# Patient Record
Sex: FEMALE | Employment: UNEMPLOYED | URBAN - METROPOLITAN AREA
[De-identification: names, ages, dates, MRNs, and addresses within clinical notes are randomized per-mention and may not be internally consistent; named-entity substitution may affect disease eponyms.]

---

## 2020-12-11 ENCOUNTER — APPOINTMENT (OUTPATIENT)
Dept: GENERAL RADIOLOGY | Age: 70
DRG: 871 | End: 2020-12-11
Payer: MEDICARE

## 2020-12-11 ENCOUNTER — HOSPITAL ENCOUNTER (INPATIENT)
Age: 70
LOS: 8 days | Discharge: HOME | DRG: 871 | End: 2020-12-20
Attending: EMERGENCY MEDICINE | Admitting: INTERNAL MEDICINE
Payer: MEDICARE

## 2020-12-11 LAB
ALBUMIN SERPL-MCNC: 2.7 G/DL (ref 3.5–5.2)
ALP BLD-CCNC: 70 U/L (ref 35–104)
ALT SERPL-CCNC: 24 U/L (ref 0–32)
ANION GAP SERPL CALCULATED.3IONS-SCNC: 10 MMOL/L (ref 7–16)
AST SERPL-CCNC: 32 U/L (ref 0–31)
BASOPHILS ABSOLUTE: 0.01 E9/L (ref 0–0.2)
BASOPHILS RELATIVE PERCENT: 0.1 % (ref 0–2)
BILIRUB SERPL-MCNC: 0.5 MG/DL (ref 0–1.2)
BUN BLDV-MCNC: 26 MG/DL (ref 8–23)
CALCIUM SERPL-MCNC: 8.1 MG/DL (ref 8.6–10.2)
CHLORIDE BLD-SCNC: 99 MMOL/L (ref 98–107)
CO2: 23 MMOL/L (ref 22–29)
CREAT SERPL-MCNC: 1.4 MG/DL (ref 0.5–1)
EOSINOPHILS ABSOLUTE: 0.04 E9/L (ref 0.05–0.5)
EOSINOPHILS RELATIVE PERCENT: 0.4 % (ref 0–6)
GFR AFRICAN AMERICAN: 45
GFR NON-AFRICAN AMERICAN: 37 ML/MIN/1.73
GLUCOSE BLD-MCNC: 142 MG/DL (ref 74–99)
HCT VFR BLD CALC: 38.2 % (ref 34–48)
HEMOGLOBIN: 12.6 G/DL (ref 11.5–15.5)
IMMATURE GRANULOCYTES #: 0.18 E9/L
IMMATURE GRANULOCYTES %: 2 % (ref 0–5)
LACTIC ACID, SEPSIS: 1.2 MMOL/L (ref 0.5–1.9)
LYMPHOCYTES ABSOLUTE: 0.72 E9/L (ref 1.5–4)
LYMPHOCYTES RELATIVE PERCENT: 8.1 % (ref 20–42)
MCH RBC QN AUTO: 29.9 PG (ref 26–35)
MCHC RBC AUTO-ENTMCNC: 33 % (ref 32–34.5)
MCV RBC AUTO: 90.5 FL (ref 80–99.9)
MONOCYTES ABSOLUTE: 0.38 E9/L (ref 0.1–0.95)
MONOCYTES RELATIVE PERCENT: 4.3 % (ref 2–12)
NEUTROPHILS ABSOLUTE: 7.61 E9/L (ref 1.8–7.3)
NEUTROPHILS RELATIVE PERCENT: 85.1 % (ref 43–80)
PDW BLD-RTO: 13.4 FL (ref 11.5–15)
PLATELET # BLD: 334 E9/L (ref 130–450)
PMV BLD AUTO: 8.9 FL (ref 7–12)
POTASSIUM REFLEX MAGNESIUM: 4.3 MMOL/L (ref 3.5–5)
PRO-BNP: 359 PG/ML (ref 0–125)
RBC # BLD: 4.22 E12/L (ref 3.5–5.5)
SARS-COV-2, NAAT: DETECTED
SODIUM BLD-SCNC: 132 MMOL/L (ref 132–146)
TOTAL PROTEIN: 6.2 G/DL (ref 6.4–8.3)
TROPONIN: <0.01 NG/ML (ref 0–0.03)
WBC # BLD: 8.9 E9/L (ref 4.5–11.5)

## 2020-12-11 PROCEDURE — 85025 COMPLETE CBC W/AUTO DIFF WBC: CPT

## 2020-12-11 PROCEDURE — 2580000003 HC RX 258: Performed by: STUDENT IN AN ORGANIZED HEALTH CARE EDUCATION/TRAINING PROGRAM

## 2020-12-11 PROCEDURE — 83880 ASSAY OF NATRIURETIC PEPTIDE: CPT

## 2020-12-11 PROCEDURE — U0002 COVID-19 LAB TEST NON-CDC: HCPCS

## 2020-12-11 PROCEDURE — 80053 COMPREHEN METABOLIC PANEL: CPT

## 2020-12-11 PROCEDURE — 71045 X-RAY EXAM CHEST 1 VIEW: CPT

## 2020-12-11 PROCEDURE — 84484 ASSAY OF TROPONIN QUANT: CPT

## 2020-12-11 PROCEDURE — 83605 ASSAY OF LACTIC ACID: CPT

## 2020-12-11 PROCEDURE — 99284 EMERGENCY DEPT VISIT MOD MDM: CPT

## 2020-12-11 PROCEDURE — 81001 URINALYSIS AUTO W/SCOPE: CPT

## 2020-12-11 PROCEDURE — 96374 THER/PROPH/DIAG INJ IV PUSH: CPT

## 2020-12-11 PROCEDURE — 87040 BLOOD CULTURE FOR BACTERIA: CPT

## 2020-12-11 PROCEDURE — 6360000002 HC RX W HCPCS: Performed by: STUDENT IN AN ORGANIZED HEALTH CARE EDUCATION/TRAINING PROGRAM

## 2020-12-11 PROCEDURE — 93005 ELECTROCARDIOGRAM TRACING: CPT | Performed by: STUDENT IN AN ORGANIZED HEALTH CARE EDUCATION/TRAINING PROGRAM

## 2020-12-11 RX ORDER — 0.9 % SODIUM CHLORIDE 0.9 %
1000 INTRAVENOUS SOLUTION INTRAVENOUS ONCE
Status: COMPLETED | OUTPATIENT
Start: 2020-12-11 | End: 2020-12-12

## 2020-12-11 RX ORDER — DEXAMETHASONE SODIUM PHOSPHATE 10 MG/ML
6 INJECTION INTRAMUSCULAR; INTRAVENOUS ONCE
Status: COMPLETED | OUTPATIENT
Start: 2020-12-11 | End: 2020-12-11

## 2020-12-11 RX ADMIN — DEXAMETHASONE SODIUM PHOSPHATE 6 MG: 10 INJECTION INTRAMUSCULAR; INTRAVENOUS at 23:37

## 2020-12-11 RX ADMIN — SODIUM CHLORIDE 1000 ML: 9 INJECTION, SOLUTION INTRAVENOUS at 23:37

## 2020-12-12 PROBLEM — J12.82 PNEUMONIA DUE TO COVID-19 VIRUS: Status: ACTIVE | Noted: 2020-12-12

## 2020-12-12 PROBLEM — U07.1 ACUTE RESPIRATORY FAILURE DUE TO COVID-19 (HCC): Status: ACTIVE | Noted: 2020-12-12

## 2020-12-12 PROBLEM — J96.00 ACUTE RESPIRATORY FAILURE DUE TO COVID-19 (HCC): Status: ACTIVE | Noted: 2020-12-12

## 2020-12-12 PROBLEM — N17.9 AKI (ACUTE KIDNEY INJURY) (HCC): Status: ACTIVE | Noted: 2020-12-12

## 2020-12-12 PROBLEM — U07.1 PNEUMONIA DUE TO COVID-19 VIRUS: Status: ACTIVE | Noted: 2020-12-12

## 2020-12-12 LAB
ALBUMIN SERPL-MCNC: 2.9 G/DL (ref 3.5–5.2)
ALP BLD-CCNC: 70 U/L (ref 35–104)
ALT SERPL-CCNC: 25 U/L (ref 0–32)
ANION GAP SERPL CALCULATED.3IONS-SCNC: 8 MMOL/L (ref 7–16)
AST SERPL-CCNC: 26 U/L (ref 0–31)
BACTERIA: ABNORMAL /HPF
BASOPHILS ABSOLUTE: 0.01 E9/L (ref 0–0.2)
BASOPHILS RELATIVE PERCENT: 0.1 % (ref 0–2)
BILIRUB SERPL-MCNC: 0.4 MG/DL (ref 0–1.2)
BILIRUBIN URINE: NEGATIVE
BLOOD, URINE: ABNORMAL
BUN BLDV-MCNC: 25 MG/DL (ref 8–23)
C-REACTIVE PROTEIN: 28.1 MG/DL (ref 0–0.4)
CALCIUM SERPL-MCNC: 7.9 MG/DL (ref 8.6–10.2)
CHLORIDE BLD-SCNC: 103 MMOL/L (ref 98–107)
CLARITY: CLEAR
CO2: 21 MMOL/L (ref 22–29)
COARSE CASTS, UA: ABNORMAL /LPF (ref 0–2)
COLOR: ABNORMAL
CREAT SERPL-MCNC: 1.1 MG/DL (ref 0.5–1)
EKG ATRIAL RATE: 110 BPM
EKG P AXIS: 68 DEGREES
EKG P-R INTERVAL: 180 MS
EKG Q-T INTERVAL: 310 MS
EKG QRS DURATION: 68 MS
EKG QTC CALCULATION (BAZETT): 419 MS
EKG R AXIS: -49 DEGREES
EKG T AXIS: 64 DEGREES
EKG VENTRICULAR RATE: 110 BPM
EOSINOPHILS ABSOLUTE: 0 E9/L (ref 0.05–0.5)
EOSINOPHILS RELATIVE PERCENT: 0 % (ref 0–6)
EPITHELIAL CELLS, UA: ABNORMAL /HPF
FERRITIN: 552 NG/ML
GFR AFRICAN AMERICAN: 59
GFR NON-AFRICAN AMERICAN: 49 ML/MIN/1.73
GLUCOSE BLD-MCNC: 167 MG/DL (ref 74–99)
GLUCOSE URINE: NEGATIVE MG/DL
HCT VFR BLD CALC: 35.6 % (ref 34–48)
HEMOGLOBIN: 11.6 G/DL (ref 11.5–15.5)
HYALINE CASTS: ABNORMAL /LPF (ref 0–2)
IMMATURE GRANULOCYTES #: 0.17 E9/L
IMMATURE GRANULOCYTES %: 2.1 % (ref 0–5)
KETONES, URINE: NEGATIVE MG/DL
LACTATE DEHYDROGENASE: 411 U/L (ref 135–214)
LACTIC ACID, SEPSIS: 1.1 MMOL/L (ref 0.5–1.9)
LEUKOCYTE ESTERASE, URINE: NEGATIVE
LYMPHOCYTES ABSOLUTE: 0.36 E9/L (ref 1.5–4)
LYMPHOCYTES RELATIVE PERCENT: 4.4 % (ref 20–42)
MCH RBC QN AUTO: 29.4 PG (ref 26–35)
MCHC RBC AUTO-ENTMCNC: 32.6 % (ref 32–34.5)
MCV RBC AUTO: 90.4 FL (ref 80–99.9)
MONOCYTES ABSOLUTE: 0.24 E9/L (ref 0.1–0.95)
MONOCYTES RELATIVE PERCENT: 2.9 % (ref 2–12)
NEUTROPHILS ABSOLUTE: 7.42 E9/L (ref 1.8–7.3)
NEUTROPHILS RELATIVE PERCENT: 90.5 % (ref 43–80)
NITRITE, URINE: NEGATIVE
PDW BLD-RTO: 13.5 FL (ref 11.5–15)
PH UA: 5.5 (ref 5–9)
PLATELET # BLD: 304 E9/L (ref 130–450)
PMV BLD AUTO: 8.8 FL (ref 7–12)
POTASSIUM REFLEX MAGNESIUM: 4.9 MMOL/L (ref 3.5–5)
PROTEIN UA: 100 MG/DL
RBC # BLD: 3.94 E12/L (ref 3.5–5.5)
RBC # BLD: NORMAL 10*6/UL
RBC UA: ABNORMAL /HPF (ref 0–2)
SODIUM BLD-SCNC: 132 MMOL/L (ref 132–146)
SPECIFIC GRAVITY UA: 1.02 (ref 1–1.03)
TOTAL PROTEIN: 6.2 G/DL (ref 6.4–8.3)
UROBILINOGEN, URINE: 1 E.U./DL
WBC # BLD: 8.2 E9/L (ref 4.5–11.5)
WBC UA: ABNORMAL /HPF (ref 0–5)

## 2020-12-12 PROCEDURE — 83615 LACTATE (LD) (LDH) ENZYME: CPT

## 2020-12-12 PROCEDURE — 6360000002 HC RX W HCPCS: Performed by: NURSE PRACTITIONER

## 2020-12-12 PROCEDURE — 82728 ASSAY OF FERRITIN: CPT

## 2020-12-12 PROCEDURE — 80053 COMPREHEN METABOLIC PANEL: CPT

## 2020-12-12 PROCEDURE — 2580000003 HC RX 258: Performed by: NURSE PRACTITIONER

## 2020-12-12 PROCEDURE — 93010 ELECTROCARDIOGRAM REPORT: CPT | Performed by: INTERNAL MEDICINE

## 2020-12-12 PROCEDURE — XW033E5 INTRODUCTION OF REMDESIVIR ANTI-INFECTIVE INTO PERIPHERAL VEIN, PERCUTANEOUS APPROACH, NEW TECHNOLOGY GROUP 5: ICD-10-PCS | Performed by: INTERNAL MEDICINE

## 2020-12-12 PROCEDURE — 85025 COMPLETE CBC W/AUTO DIFF WBC: CPT

## 2020-12-12 PROCEDURE — 2500000003 HC RX 250 WO HCPCS: Performed by: NURSE PRACTITIONER

## 2020-12-12 PROCEDURE — 86140 C-REACTIVE PROTEIN: CPT

## 2020-12-12 PROCEDURE — 2060000000 HC ICU INTERMEDIATE R&B

## 2020-12-12 PROCEDURE — 83605 ASSAY OF LACTIC ACID: CPT

## 2020-12-12 RX ORDER — ACETAMINOPHEN 650 MG/1
650 SUPPOSITORY RECTAL EVERY 6 HOURS PRN
Status: DISCONTINUED | OUTPATIENT
Start: 2020-12-12 | End: 2020-12-20 | Stop reason: HOSPADM

## 2020-12-12 RX ORDER — POTASSIUM CHLORIDE 7.45 MG/ML
10 INJECTION INTRAVENOUS PRN
Status: DISCONTINUED | OUTPATIENT
Start: 2020-12-12 | End: 2020-12-20 | Stop reason: HOSPADM

## 2020-12-12 RX ORDER — PROMETHAZINE HYDROCHLORIDE 25 MG/1
12.5 TABLET ORAL EVERY 6 HOURS PRN
Status: DISCONTINUED | OUTPATIENT
Start: 2020-12-12 | End: 2020-12-20 | Stop reason: HOSPADM

## 2020-12-12 RX ORDER — SODIUM CHLORIDE 9 MG/ML
INJECTION, SOLUTION INTRAVENOUS CONTINUOUS
Status: DISCONTINUED | OUTPATIENT
Start: 2020-12-12 | End: 2020-12-19

## 2020-12-12 RX ORDER — SENNA PLUS 8.6 MG/1
1 TABLET ORAL DAILY PRN
Status: DISCONTINUED | OUTPATIENT
Start: 2020-12-12 | End: 2020-12-20 | Stop reason: HOSPADM

## 2020-12-12 RX ORDER — SODIUM CHLORIDE 0.9 % (FLUSH) 0.9 %
10 SYRINGE (ML) INJECTION EVERY 12 HOURS SCHEDULED
Status: DISCONTINUED | OUTPATIENT
Start: 2020-12-12 | End: 2020-12-20 | Stop reason: HOSPADM

## 2020-12-12 RX ORDER — HYDROCHLOROTHIAZIDE 12.5 MG/1
12.5 TABLET ORAL DAILY
COMMUNITY
End: 2020-12-12

## 2020-12-12 RX ORDER — SODIUM CHLORIDE 0.9 % (FLUSH) 0.9 %
10 SYRINGE (ML) INJECTION PRN
Status: DISCONTINUED | OUTPATIENT
Start: 2020-12-12 | End: 2020-12-20 | Stop reason: HOSPADM

## 2020-12-12 RX ORDER — ACETAMINOPHEN 325 MG/1
650 TABLET ORAL EVERY 6 HOURS PRN
Status: DISCONTINUED | OUTPATIENT
Start: 2020-12-12 | End: 2020-12-20 | Stop reason: HOSPADM

## 2020-12-12 RX ORDER — 0.9 % SODIUM CHLORIDE 0.9 %
30 INTRAVENOUS SOLUTION INTRAVENOUS PRN
Status: DISCONTINUED | OUTPATIENT
Start: 2020-12-12 | End: 2020-12-20 | Stop reason: HOSPADM

## 2020-12-12 RX ORDER — POTASSIUM CHLORIDE 20 MEQ/1
40 TABLET, EXTENDED RELEASE ORAL PRN
Status: DISCONTINUED | OUTPATIENT
Start: 2020-12-12 | End: 2020-12-20 | Stop reason: HOSPADM

## 2020-12-12 RX ORDER — HYDROCHLOROTHIAZIDE 12.5 MG/1
12.5 CAPSULE, GELATIN COATED ORAL DAILY
COMMUNITY

## 2020-12-12 RX ORDER — DEXAMETHASONE SODIUM PHOSPHATE 10 MG/ML
6 INJECTION INTRAMUSCULAR; INTRAVENOUS DAILY
Status: DISCONTINUED | OUTPATIENT
Start: 2020-12-12 | End: 2020-12-20 | Stop reason: HOSPADM

## 2020-12-12 RX ORDER — ONDANSETRON 2 MG/ML
4 INJECTION INTRAMUSCULAR; INTRAVENOUS EVERY 6 HOURS PRN
Status: DISCONTINUED | OUTPATIENT
Start: 2020-12-12 | End: 2020-12-20 | Stop reason: HOSPADM

## 2020-12-12 RX ADMIN — SODIUM CHLORIDE: 9 INJECTION, SOLUTION INTRAVENOUS at 08:59

## 2020-12-12 RX ADMIN — Medication 10 ML: at 21:39

## 2020-12-12 RX ADMIN — REMDESIVIR 200 MG: 100 INJECTION, POWDER, LYOPHILIZED, FOR SOLUTION INTRAVENOUS at 09:24

## 2020-12-12 RX ADMIN — DEXAMETHASONE SODIUM PHOSPHATE 6 MG: 10 INJECTION INTRAMUSCULAR; INTRAVENOUS at 08:59

## 2020-12-12 RX ADMIN — ENOXAPARIN SODIUM 40 MG: 40 INJECTION SUBCUTANEOUS at 08:59

## 2020-12-12 ASSESSMENT — ENCOUNTER SYMPTOMS
SORE THROAT: 0
VOMITING: 0
WHEEZING: 0
ABDOMINAL DISTENTION: 0
SINUS PRESSURE: 0
BACK PAIN: 0
NAUSEA: 0
EYE PAIN: 0
SHORTNESS OF BREATH: 1
EYE DISCHARGE: 0
EYE REDNESS: 0
COUGH: 1
DIARRHEA: 0

## 2020-12-12 NOTE — ED PROVIDER NOTES
[facility]  Department of Emergency Medicine     Written by: Misael Anderson DO  Patient Name: Danielle Cuellar  Attending Provider: Mechelle Sacnhez DO  Admit Date: 2020  9:27 PM  MRN: [id number]                   : 1950        No chief complaint on file. - Chief complaint    Patient past medical history of hypertension, patient presents to the ED shortness of breath. Patient has for the past 2 weeks she has had increasing shortness of breath. She has multiple family members around to have tested positive for COVID-19. She states that initially last week her symptoms began with some diarrhea but have since progressed to shortness of breath. She also notes that she has had a mild fever of 101 degrees. She has been taking aspirin with minimal improvement. She states that her shortness of breath worsened today as well as when she developed a nonproductive cough. Patient was placed on oxygen by EMS due to satting 84% on room air. Patient denies any nausea, vomiting, chest pain, abdominal pain or constipation. The history is provided by the patient. No  was used. Review of Systems   Constitutional: Negative for chills and fever. HENT: Negative for ear pain, sinus pressure and sore throat. Eyes: Negative for pain, discharge and redness. Respiratory: Positive for cough and shortness of breath. Negative for wheezing. Cardiovascular: Negative for chest pain. Gastrointestinal: Negative for abdominal distention, diarrhea, nausea and vomiting. Genitourinary: Negative for dysuria and frequency. Musculoskeletal: Negative for arthralgias and back pain. Skin: Negative for rash and wound. Neurological: Negative for weakness and headaches. Hematological: Negative for adenopathy. All other systems reviewed and are negative. Physical Exam  Vitals signs and nursing note reviewed.    Constitutional:       General: She is not in acute distress. Appearance: Normal appearance. HENT:      Head: Normocephalic and atraumatic. Nose: No congestion or rhinorrhea. Mouth/Throat:      Mouth: Mucous membranes are moist.      Pharynx: Oropharynx is clear. Eyes:      Extraocular Movements: Extraocular movements intact. Pupils: Pupils are equal, round, and reactive to light. Neck:      Musculoskeletal: Normal range of motion. No neck rigidity or muscular tenderness. Cardiovascular:      Rate and Rhythm: Normal rate and regular rhythm. Heart sounds: No murmur. No gallop. Pulmonary:      Effort: Pulmonary effort is normal. No respiratory distress. Breath sounds: Rhonchi present. No wheezing or rales. Abdominal:      General: Abdomen is flat. Palpations: Abdomen is soft. There is no mass. Tenderness: There is no abdominal tenderness. There is no guarding. Hernia: No hernia is present. Musculoskeletal: Normal range of motion. General: No swelling, tenderness or signs of injury. Skin:     General: Skin is warm and dry. Capillary Refill: Capillary refill takes less than 2 seconds. Neurological:      General: No focal deficit present. Mental Status: She is alert and oriented to person, place, and time. Mental status is at baseline. Psychiatric:         Mood and Affect: Mood normal.          Procedures   EKG #1:  Interpreted by emergency department physician unless otherwise noted. Time:  2158    Rate: 110  Rhythm: Sinus. Interpretation: EKG reviewed demonstrated sinus tachycardia with PVCs, rate 110, axis,  no acute ST segment changes. Comparison: stable as compared to patient's most recent EKG. MDM  Number of Diagnoses or Management Options  COVID-19:   Elevated serum creatinine:   Diagnosis management comments: Patient 22-year-old female past medical history hypertension.   Patient presents with 2-week history of fatigue, 1 week history of diarrhea 1 week history of shortness of breath. Patient has multiple exposures to COVID-19. Patient hypoxic on arrival of EMS patient placed on 4 l nasal cannula with improvement to 98%. On presentation SPO2 90%, patient was mildly tachycardic with a rate of 115. Lungs demonstrated diffuse rhonchi bilaterally. EKG showed sinus tachycardia, no acute ST segment changes chest x-ray demonstrated mid and lower lobe consolidations consistent with pneumonia. CMP remarkable for mildly elevated creatinine of 1.4, CBC demonstrated no signs of leukocytosis. Rapid Covid swab was obtained which was positive. Patient was given 6 mg IV Decadron. Due to patient's hypoxia in the setting of COVID-19 infection, decision was made to admit patient. Case discussed with hospitalist who agreed to meet patient. Plan of care discussed with patient include admission, all questions were answered patient agreed with the plan of care and was admitted to the hospital stable condition. Amount and/or Complexity of Data Reviewed  Clinical lab tests: ordered and reviewed  Tests in the radiology section of CPT®: ordered and reviewed    Risk of Complications, Morbidity, and/or Mortality  Presenting problems: moderate  Diagnostic procedures: moderate  Management options: moderate    Patient Progress  Patient progress: stable           --------------------------------------------- PAST HISTORY ---------------------------------------------  Past Medical History:  has a past medical history of Breast disorder and Hypertension. Past Surgical History:  has a past surgical history that includes Breast biopsy (1990). Social History:  reports that she quit smoking about 3 years ago. Her smoking use included cigarettes. She has never used smokeless tobacco. She reports current alcohol use. She reports that she does not use drugs.     Family History: family history includes Diabetes in her mother; Heart Disease in her father; Heart Failure in her mother; Hypertension in her father and mother; Other in her mother; Ovarian Cancer in an other family member; Thyroid Disease in her sister. The patients home medications have been reviewed.     Allergies: Aspirin and Nickel    -------------------------------------------------- RESULTS -------------------------------------------------    LABS:  Results for orders placed or performed during the hospital encounter of 12/11/20   CBC Auto Differential   Result Value Ref Range    WBC 8.9 4.5 - 11.5 E9/L    RBC 4.22 3.50 - 5.50 E12/L    Hemoglobin 12.6 11.5 - 15.5 g/dL    Hematocrit 38.2 34.0 - 48.0 %    MCV 90.5 80.0 - 99.9 fL    MCH 29.9 26.0 - 35.0 pg    MCHC 33.0 32.0 - 34.5 %    RDW 13.4 11.5 - 15.0 fL    Platelets 038 694 - 221 E9/L    MPV 8.9 7.0 - 12.0 fL    Neutrophils % 85.1 (H) 43.0 - 80.0 %    Immature Granulocytes % 2.0 0.0 - 5.0 %    Lymphocytes % 8.1 (L) 20.0 - 42.0 %    Monocytes % 4.3 2.0 - 12.0 %    Eosinophils % 0.4 0.0 - 6.0 %    Basophils % 0.1 0.0 - 2.0 %    Neutrophils Absolute 7.61 (H) 1.80 - 7.30 E9/L    Immature Granulocytes # 0.18 E9/L    Lymphocytes Absolute 0.72 (L) 1.50 - 4.00 E9/L    Monocytes Absolute 0.38 0.10 - 0.95 E9/L    Eosinophils Absolute 0.04 (L) 0.05 - 0.50 E9/L    Basophils Absolute 0.01 0.00 - 0.20 E9/L   Comprehensive Metabolic Panel w/ Reflex to MG   Result Value Ref Range    Sodium 132 132 - 146 mmol/L    Potassium reflex Magnesium 4.3 3.5 - 5.0 mmol/L    Chloride 99 98 - 107 mmol/L    CO2 23 22 - 29 mmol/L    Anion Gap 10 7 - 16 mmol/L    Glucose 142 (H) 74 - 99 mg/dL    BUN 26 (H) 8 - 23 mg/dL    CREATININE 1.4 (H) 0.5 - 1.0 mg/dL    GFR Non-African American 37 >=60 mL/min/1.73    GFR African American 45     Calcium 8.1 (L) 8.6 - 10.2 mg/dL    Total Protein 6.2 (L) 6.4 - 8.3 g/dL    Alb 2.7 (L) 3.5 - 5.2 g/dL    Total Bilirubin 0.5 0.0 - 1.2 mg/dL    Alkaline Phosphatase 70 35 - 104 U/L    ALT 24 0 - 32 U/L    AST 32 (H) 0 - 31 U/L   Troponin   Result Value Ref Range    Troponin <0.01 0.00 - 0.03 ng/mL   Brain Natriuretic Peptide   Result Value Ref Range    Pro- (H) 0 - 125 pg/mL   Lactate, Sepsis   Result Value Ref Range    Lactic Acid, Sepsis 1.2 0.5 - 1.9 mmol/L   COVID-19   Result Value Ref Range    SARS-CoV-2, NAAT DETECTED (A) Not Detected   EKG 12 Lead   Result Value Ref Range    Ventricular Rate 110 BPM    Atrial Rate 110 BPM    P-R Interval 180 ms    QRS Duration 68 ms    Q-T Interval 310 ms    QTc Calculation (Bazett) 419 ms    P Axis 68 degrees    R Axis -49 degrees    T Axis 64 degrees       RADIOLOGY:  XR CHEST PORTABLE   Final Result   Mid and lower lung consolidations consistent with pneumonia.            ------------------------- NURSING NOTES AND VITALS REVIEWED ---------------------------  Date / Time Roomed:  12/11/2020  9:27 PM  ED Bed Assignment:  11/11    The nursing notes within the ED encounter and vital signs as below have been reviewed. Patient Vitals for the past 24 hrs:   BP Temp Pulse Resp SpO2   12/11/20 2149 131/81 97.7 °F (36.5 °C) 115 14 98 %       Oxygen Saturation Interpretation: Abnormal and Improved after treatment    ------------------------------------------ PROGRESS NOTES ------------------------------------------  Re-evaluation(s):  Time: 2300  Patients symptoms are improving  Repeat physical examination is improved    Counseling:  I have spoken with the patient and discussed todays results, in addition to providing specific details for the plan of care and counseling regarding the diagnosis and prognosis. Their questions are answered at this time and they are agreeable with the plan of admission.    --------------------------------- ADDITIONAL PROVIDER NOTES ---------------------------------  Consultations:  Time: 2335. Spoke with [employer]. Discussed case. They will admit the patient.   This patient's ED course included: a personal history and physicial examination, re-evaluation prior to disposition, cardiac monitoring and continuous pulse oximetry    This patient has remained hemodynamically stable during their ED course. Diagnosis:  1. COVID-19    2. Elevated serum creatinine        Disposition:  Patient's disposition: Admit to telemetry  Patient's condition is stable. Patient was seen and evaluated by myself and my attending Manjula De León DO. Assessment and Plan discussed with attending provider, please see attestation for final plan of care.      DO Princess Alves DO  Resident  12/12/20 0000

## 2020-12-12 NOTE — H&P
Raúl Ames M.D. History and Physical      CHIEF COMPLAINT:  Cough sob and fevers at home     Reason for Admission:  Hypoxia covid 19     History Obtained From: pt and emr     HISTORY OF PRESENT ILLNESS:      The patient is a 65 y.o. female of Jone Kahn DO with significant past medical history of no medical conditions  who presents with co weakness fevers and cough / sob that started about a week ago. It has gotten progressively worse. No mylagias  or chills are reported. She is evaluated in ed and noted to be hypoxemic. 92 % on 6l. She is therefore admitted for further evaluation. BP (!) 144/77   Pulse 72   Temp 97.7 °F (36.5 °C)   Resp 20   Ht 5' 6.14\" (1.68 m)   SpO2 92%   BMI 33.24 kg/m²     All labs personally reviewed  - crp 28,   All imaging personally reviewed - mid and ll consolidations     Past Medical History:        Diagnosis Date    Breast disorder     FIBROCYSTIC    Hypertension      Past Surgical History:        Procedure Laterality Date    BREAST BIOPSY  1990    RIGHT BREAST BIOPSY FATTY TUMOR         Medications Prior to Admission:    Not in a hospital admission. Allergies:  Aspirin and Nickel    Social History:   TOBACCO:   reports that she quit smoking about 3 years ago. Her smoking use included cigarettes. She has never used smokeless tobacco.  ETOH:   reports current alcohol use.   MARITAL STATUS:    OCCUPATION:      Family History:       Problem Relation Age of Onset    Heart Disease Father         CORONARY THROMBOSIS    Hypertension Father     Heart Failure Mother     Hypertension Mother     Other Mother         PEPTIC ULCER    Diabetes Mother     Thyroid Disease Sister     Ovarian Cancer Other         AUNT       REVIEW OF SYSTEMS:    General ROS: positive for  - fatigue, fever and malaise  Hematological and Lymphatic ROS: negative  Endocrine ROS: negative  Respiratory ROS: no cough, shortness of breath, or No resolved hospital problems.  *        PLAN:    Admit to tele for covid 19 pna w hypoxemia     remdisivir  and decadron po     Qod anti inflammatory markers     Supportive care with mdi , vitamins        DVT prophylaxis  PT OT  Discharge plan once o2 demands decreased     Fernando Bloom MD  12/12/2020  10:06 AM

## 2020-12-12 NOTE — PROGRESS NOTES
Remdesivir Initiation Note    This patient meets criteria for initiation of remdesivir based on the following:  Proven COVID-19  Moderate disease (Requiring supplemental O2)  Acceptable hepatic function (ALT within 5 times ULN)    Exclusion Criteria:  Severe disease requiring invasive or non-invasive mechanical ventilation (includes HFNC & BiPAP)  Could consider use in patients requiring high flow if early on in the disease course (based on symptom duration)  Use of more than 1 vasopressor prior to remdesivir initiation  Already improving on supportive treatment and/or impending discharge  Patients in whom the clinical team think death is in the immediate short-term where remdesivir is unlikely to change the clinical outcome     Liver function tests will be monitored daily while on remdesivir.

## 2020-12-13 LAB
INR BLD: 1.1
PROTHROMBIN TIME: 12.4 SEC (ref 9.3–12.4)

## 2020-12-13 PROCEDURE — 2500000003 HC RX 250 WO HCPCS: Performed by: NURSE PRACTITIONER

## 2020-12-13 PROCEDURE — 36415 COLL VENOUS BLD VENIPUNCTURE: CPT

## 2020-12-13 PROCEDURE — 2700000000 HC OXYGEN THERAPY PER DAY

## 2020-12-13 PROCEDURE — 85610 PROTHROMBIN TIME: CPT

## 2020-12-13 PROCEDURE — 2060000000 HC ICU INTERMEDIATE R&B

## 2020-12-13 PROCEDURE — 2580000003 HC RX 258: Performed by: NURSE PRACTITIONER

## 2020-12-13 PROCEDURE — 6360000002 HC RX W HCPCS: Performed by: NURSE PRACTITIONER

## 2020-12-13 RX ADMIN — SODIUM CHLORIDE: 9 INJECTION, SOLUTION INTRAVENOUS at 00:06

## 2020-12-13 RX ADMIN — SODIUM CHLORIDE: 9 INJECTION, SOLUTION INTRAVENOUS at 20:42

## 2020-12-13 RX ADMIN — ENOXAPARIN SODIUM 40 MG: 40 INJECTION SUBCUTANEOUS at 08:26

## 2020-12-13 RX ADMIN — Medication 10 ML: at 20:42

## 2020-12-13 RX ADMIN — DEXAMETHASONE SODIUM PHOSPHATE 6 MG: 10 INJECTION INTRAMUSCULAR; INTRAVENOUS at 08:27

## 2020-12-13 RX ADMIN — REMDESIVIR 100 MG: 100 INJECTION, POWDER, LYOPHILIZED, FOR SOLUTION INTRAVENOUS at 19:38

## 2020-12-13 NOTE — PLAN OF CARE
Problem: Skin Integrity:  Goal: Will show no infection signs and symptoms  Description: Will show no infection signs and symptoms  Outcome: Met This Shift     Problem: Falls - Risk of:  Goal: Will remain free from falls  Description: Will remain free from falls  Outcome: Met This Shift     Problem: Airway Clearance - Ineffective  Goal: Achieve or maintain patent airway  Outcome: Ongoing     Problem: Fatigue  Goal: Verbalize increase energy and improved vitality  Outcome: Ongoing

## 2020-12-13 NOTE — PROGRESS NOTES
Messaged Dr. Austin Patel regarding patients blood pressure. No new orders at this time.     Delores oSlis

## 2020-12-13 NOTE — ED NOTES
Pt attempted to be weaned off of NRB pt sat dropped to 89% NRB placed back on      Shahida Ferrell RN  12/12/20 2121

## 2020-12-14 LAB
ANION GAP SERPL CALCULATED.3IONS-SCNC: 8 MMOL/L (ref 7–16)
ANISOCYTOSIS: ABNORMAL
BASOPHILS ABSOLUTE: 0.02 E9/L (ref 0–0.2)
BASOPHILS RELATIVE PERCENT: 0.1 % (ref 0–2)
BUN BLDV-MCNC: 28 MG/DL (ref 8–23)
BURR CELLS: ABNORMAL
C-REACTIVE PROTEIN: 5.6 MG/DL (ref 0–0.4)
CALCIUM SERPL-MCNC: 8.2 MG/DL (ref 8.6–10.2)
CHLORIDE BLD-SCNC: 106 MMOL/L (ref 98–107)
CO2: 23 MMOL/L (ref 22–29)
CREAT SERPL-MCNC: 1 MG/DL (ref 0.5–1)
D DIMER: 2919 NG/ML DDU
EOSINOPHILS ABSOLUTE: 0 E9/L (ref 0.05–0.5)
EOSINOPHILS RELATIVE PERCENT: 0 % (ref 0–6)
GFR AFRICAN AMERICAN: >60
GFR NON-AFRICAN AMERICAN: 54 ML/MIN/1.73
GLUCOSE BLD-MCNC: 143 MG/DL (ref 74–99)
HCT VFR BLD CALC: 35.7 % (ref 34–48)
HEMOGLOBIN: 11.7 G/DL (ref 11.5–15.5)
IMMATURE GRANULOCYTES #: 0.31 E9/L
IMMATURE GRANULOCYTES %: 1.6 % (ref 0–5)
LYMPHOCYTES ABSOLUTE: 0.58 E9/L (ref 1.5–4)
LYMPHOCYTES RELATIVE PERCENT: 3 % (ref 20–42)
MCH RBC QN AUTO: 29.8 PG (ref 26–35)
MCHC RBC AUTO-ENTMCNC: 32.8 % (ref 32–34.5)
MCV RBC AUTO: 90.8 FL (ref 80–99.9)
MONOCYTES ABSOLUTE: 0.84 E9/L (ref 0.1–0.95)
MONOCYTES RELATIVE PERCENT: 4.3 % (ref 2–12)
NEUTROPHILS ABSOLUTE: 17.6 E9/L (ref 1.8–7.3)
NEUTROPHILS RELATIVE PERCENT: 91 % (ref 43–80)
OVALOCYTES: ABNORMAL
PDW BLD-RTO: 13.6 FL (ref 11.5–15)
PLATELET # BLD: 392 E9/L (ref 130–450)
PMV BLD AUTO: 9 FL (ref 7–12)
POIKILOCYTES: ABNORMAL
POLYCHROMASIA: ABNORMAL
POTASSIUM SERPL-SCNC: 5.1 MMOL/L (ref 3.5–5)
RBC # BLD: 3.93 E12/L (ref 3.5–5.5)
SODIUM BLD-SCNC: 137 MMOL/L (ref 132–146)
TROPONIN: <0.01 NG/ML (ref 0–0.03)
WBC # BLD: 19.4 E9/L (ref 4.5–11.5)

## 2020-12-14 PROCEDURE — 36415 COLL VENOUS BLD VENIPUNCTURE: CPT

## 2020-12-14 PROCEDURE — 85025 COMPLETE CBC W/AUTO DIFF WBC: CPT

## 2020-12-14 PROCEDURE — 84484 ASSAY OF TROPONIN QUANT: CPT

## 2020-12-14 PROCEDURE — 93005 ELECTROCARDIOGRAM TRACING: CPT | Performed by: INTERNAL MEDICINE

## 2020-12-14 PROCEDURE — 86140 C-REACTIVE PROTEIN: CPT

## 2020-12-14 PROCEDURE — 6360000002 HC RX W HCPCS: Performed by: NURSE PRACTITIONER

## 2020-12-14 PROCEDURE — 6370000000 HC RX 637 (ALT 250 FOR IP): Performed by: INTERNAL MEDICINE

## 2020-12-14 PROCEDURE — 2060000000 HC ICU INTERMEDIATE R&B

## 2020-12-14 PROCEDURE — 97165 OT EVAL LOW COMPLEX 30 MIN: CPT

## 2020-12-14 PROCEDURE — 80048 BASIC METABOLIC PNL TOTAL CA: CPT

## 2020-12-14 PROCEDURE — 2580000003 HC RX 258: Performed by: NURSE PRACTITIONER

## 2020-12-14 PROCEDURE — 85378 FIBRIN DEGRADE SEMIQUANT: CPT

## 2020-12-14 PROCEDURE — 97530 THERAPEUTIC ACTIVITIES: CPT

## 2020-12-14 PROCEDURE — 97162 PT EVAL MOD COMPLEX 30 MIN: CPT

## 2020-12-14 PROCEDURE — 2500000003 HC RX 250 WO HCPCS: Performed by: NURSE PRACTITIONER

## 2020-12-14 RX ORDER — HYDROCHLOROTHIAZIDE 12.5 MG/1
12.5 TABLET ORAL DAILY
Status: DISCONTINUED | OUTPATIENT
Start: 2020-12-14 | End: 2020-12-20 | Stop reason: HOSPADM

## 2020-12-14 RX ORDER — LISINOPRIL 10 MG/1
10 TABLET ORAL DAILY
Status: DISCONTINUED | OUTPATIENT
Start: 2020-12-14 | End: 2020-12-20 | Stop reason: HOSPADM

## 2020-12-14 RX ADMIN — SODIUM CHLORIDE: 9 INJECTION, SOLUTION INTRAVENOUS at 17:06

## 2020-12-14 RX ADMIN — HYDROCHLOROTHIAZIDE 12.5 MG: 12.5 TABLET ORAL at 19:17

## 2020-12-14 RX ADMIN — ENOXAPARIN SODIUM 40 MG: 40 INJECTION SUBCUTANEOUS at 09:05

## 2020-12-14 RX ADMIN — Medication 10 ML: at 09:06

## 2020-12-14 RX ADMIN — LISINOPRIL 10 MG: 10 TABLET ORAL at 19:17

## 2020-12-14 RX ADMIN — DEXAMETHASONE SODIUM PHOSPHATE 6 MG: 10 INJECTION INTRAMUSCULAR; INTRAVENOUS at 09:05

## 2020-12-14 RX ADMIN — REMDESIVIR 100 MG: 100 INJECTION, POWDER, LYOPHILIZED, FOR SOLUTION INTRAVENOUS at 17:06

## 2020-12-14 ASSESSMENT — PAIN SCALES - GENERAL: PAINLEVEL_OUTOF10: 0

## 2020-12-14 NOTE — PLAN OF CARE
Problem: Gas Exchange - Impaired  Goal: Absence of hypoxia  Outcome: Met This Shift     Problem: Fatigue  Goal: Verbalize increase energy and improved vitality  12/14/2020 1426 by Negra Parrish RN  Outcome: Met This Shift  12/14/2020 0059 by Rosendo Nieto RN  Outcome: Ongoing     Problem: Skin Integrity:  Goal: Will show no infection signs and symptoms  Description: Will show no infection signs and symptoms  12/14/2020 1426 by Negra Parrish RN  Outcome: Met This Shift  12/14/2020 0059 by Rosendo Nieto RN  Outcome: Met This Shift  Goal: Absence of new skin breakdown  Description: Absence of new skin breakdown  Outcome: Met This Shift     Problem: Falls - Risk of:  Goal: Will remain free from falls  Description: Will remain free from falls  12/14/2020 1426 by Negra Parrish RN  Outcome: Met This Shift  12/14/2020 0059 by Rosendo Nieto RN  Outcome: Met This Shift  Goal: Absence of physical injury  Description: Absence of physical injury  Outcome: Met This Shift

## 2020-12-14 NOTE — PROGRESS NOTES
Subjective: The patient is awake and alert. No acute events overnight. Denies chest pain, angina, SOB   Still on nonrebreather at 15 L  Feels the same. Dyspneic on ambulation    Objective:    BP (!) 156/79   Pulse 86   Temp 96.2 °F (35.7 °C) (Axillary)   Resp 21   Ht 5' 6\" (1.676 m)   Wt 242 lb 6.4 oz (110 kg)   SpO2 92%   BMI 39.12 kg/m²     In: 300 [P.O.:300]  Out: 1200   In: 300   Out: 1200 [Urine:1200]    General appearance: NAD, conversant  HEENT: AT/NC, MMM  Neck: FROM, supple  Lungs: Clear to auscultation  CV: RRR, no MRGs  Vasc: Radial pulses 2+  Abdomen: Soft, non-tender; no masses or HSM  Extremities: No peripheral edema or digital cyanosis  Skin: no rash, lesions or ulcers  Psych: Alert and oriented to person, place and time  Neuro: Alert and interactive     Recent Labs     12/11/20  2216 12/12/20  0615   WBC 8.9 8.2   HGB 12.6 11.6   HCT 38.2 35.6    304       Recent Labs     12/11/20  2216 12/12/20  0615    132   K 4.3 4.9   CL 99 103   CO2 23 21*   BUN 26* 25*   CREATININE 1.4* 1.1*   CALCIUM 8.1* 7.9*       Assessment:    Principal Problem:    Acute respiratory failure due to COVID-19 [facility])  Active Problems:    SUKH (acute kidney injury) ([facility] [address]    Pneumonia due to COVID-19 virus  Resolved Problems:    * No resolved hospital problems.  *      Plan:    Admit to tele for covid 19 pna w hypoxemia      remdisivir day 3 and decadron po-saturating at 92% on 15 l     Qod anti inflammatory markers      Supportive care with mdi , vitamins    pulm eval id d dimer up     Labs today     Check a.m. labs  DVT Prophylaxis   PT/OT  Discharge planning           Royal Anurag MD  12:15 PM  12/14/2020

## 2020-12-14 NOTE — PROGRESS NOTES
Physical Therapy  Physical Therapy Initial Assessment   Name: González Mayen  : 1950  MRN: [id number]    Referring Provider:  SANJUANA Rankin CNP    Date of Service: 2020    Evaluating PT:  Silvia Iraheta PT, DPT [id number]    Room #:  5940/4943-K  Diagnosis:  Acute Respiratory Failure due COVID-19  PMHx/PSHx:  HTN  Precautions:  Falls, Droplet Plus Isolation, COVID +, O2 via HFNC  Equipment Needs:  TBD    SUBJECTIVE:    Pt lives with daughter, DARA, and grand children in a 2 story home with 4 stairs to enter and 1 rail. Bed is on first floor and bath is on first floor. Pt ambulated with no AD independently PTA. OBJECTIVE:   Initial Evaluation  Date: 2020 Treatment Short Term/ Long Term   Goals   AM-PAC 6 Clicks 64/49     Was pt agreeable to Eval/treatment? yes     Does pt have pain? No c/o pain     Bed Mobility  NT, pt in bedside chair at beginning and end of session  Rolling: Independent  Supine to sit: Independent  Sit to supine: Independent  Scooting: Independent   Transfers Sit to stand: SBA  Stand to sit: SBA  Stand pivot: SBA no AD  Sit to stand: Independent  Stand to sit: Independent  Stand pivot: Independent   Ambulation    25 feet with no AD SBA  150 feet with no AD Independent   Stair negotiation: ascended and descended  NT  4 steps with 1 rail Jaz   ROM BUE:  Per OT note  BLE:  WNL     Strength BUE:  Per OT note  BLE:  WNL     Balance Sitting EOB:  Independent  Dynamic Standing:  SBA no AD  Sitting EOB:  Independent  Dynamic Standing:  Independent     Pt is A & O x 4  Sensation:  Pt denies numbness and tingling to extremities  Edema:  unremarkable    Vitals:  SPo2 monitored throughout session on 15L HFNC  Resting 96%  With light activity 88-90%  Ambulating 80%  Seated recovery 90% in 2 minutes  91% on departure seated in chair    Patient education  Pt educated on role of PT intervention.   Pt educated on safety in room with utilization of call light for assistance with mobility. Pt educated on importance of maximizing OOB time by transferring to bedside chair for meals and ambulating to bathroom/transferring to bedside commode with assistance from nursing and therapy staff to increase functional activity tolerance and overall functional independence. Patient response to education:   Pt verbalized understanding Pt demonstrated skill Pt requires further education in this area   yes yes yes     ASSESSMENT:    Comments:  Additional time was required for donning/doffing of all necessary PPE for droplet plus isolation for COVID-19 as well as proper sanitization of all equipment utilized during session. RN cleared pt for activity prior to session. Pt received supine in bed and agreeable to PT intervention at this time. Pt performed all functional mobility as noted above. Pt functioning near baseline in terms of balance and strength but currently has impaired activity tolerance and is requiring supplemental O2 to maintain good SPO2. Pt ambulated to bathroom where she completed toileting and hygiene at SBA level. Pt then ambulated back to bedside chair where she was left with all needs met and call light in reach. Pt requires continued skilled PT intervention for the purposes of improving functional activity tolerance to restore functional independence. Treatment:  Patient practiced and was instructed in the following treatment:     Therapeutic Activities Completed:  o Functional mobility as noted above:   - Transfer training: SBA all aspects for safety, line management and vitals monitoring. Pt able to complete with min VC for proper use of BUE. Stand pivot completed x 2 reps chair<>toilet. STS completed from chair and toilet both at SBA. - Ambulation: 25 feet with no AD x 2 reps. Pt desaturates quickly with activity but does not appear to be in distress.     o Skilled repositioning in seated position for comfort and good posture to promote improved cardiorespiratory function. o Skilled vitals monitoring as noted above.  o Pt education as noted above. Pt's/ family goals   1. Return home. Patient and or family understand(s) diagnosis, prognosis, and plan of care. yes    PLAN OF CARE:    Current Treatment Recommendations     [x] Strengthening     [x] ROM   [x] Balance Training   [x] Endurance Training   [x] Transfer Training   [x] Gait Training   [x] Stair Training   [x] Positioning   [x] Safety and Education Training   [x] Patient/Caregiver Education   [] HEP  [] Other     PT care will be provided in accordance with the objectives noted above. The above treatment recommendations will be utilized to address deficits described above in order to restore pt's prior level of function and/or achieve modified functional independence with adaptive strategies. Frequency of treatments: 2-5x/week x 1-2 weeks. Time in  1445  Time out  1505    Total Treatment Time  15 minutes     Evaluation Time includes thorough review of current medical information, gathering information on past medical history/social history and prior level of function, completion of standardized testing/informal observation of tasks, assessment of data and education on plan of care and goals.     CPT codes:  [] Low Complexity PT evaluation 58081  [x] Moderate Complexity PT evaluation 24491  [] High Complexity PT evaluation 65411  [] PT Re-evaluation 34548  [] Gait training 64153 0 minutes  [] Manual therapy 52403 0 minutes  [x] Therapeutic activities 88519 15 minutes  [] Therapeutic exercises 04594 0 minutes  [] Neuromuscular reeducation 54603 0 minutes     Cris Garnica, PT, DPT  [id number]

## 2020-12-14 NOTE — PROGRESS NOTES
tasks       Hearing: [facility]   Sensation:  No c/o numbness or tingling     Comments: Upon arrival patient lying in bed . At end of session, patient sitting in chair  with call light and phone within reach, all lines and tubes intact. Overall patient demonstrated  decreased independence and safety during completion of ADL/functional transfer/mobility tasks. Pt would benefit from continued skilled OT to increase safety and independence with completion of ADL/IADL tasks for functional independence and quality of life. Assessment of current deficits   Functional mobility [x]  ADLs [x] Strength [x]  Cognition []  Functional transfers  [x] IADLs [x] Safety Awareness [x]  Endurance [x]  Fine Motor Coordination [] Balance [x] Vision/perception [] Sensation []   Gross Motor Coordination [] ROM [] Delirium []                  Motor Control []       Plan of Care: 1-3 days/week for 1-2 weeks PRN   [x]ADL retraining/adapted techniques and AE recommendations to increase functional independence within precautions                    [x]Energy conservation techniques to improve tolerance for selfcare routine   [x]Functional transfer/mobility training/DME recommendations for increased independence, safety and fall prevention         [x]Patient/family education to increase safety and functional independence             [x]Environmental modifications for safe mobility and completion of ADLs                             []Cognitive retraining ex's to improve problem solving skills & safe participation in ADLs/IADLs     []Sensory re-education techniques to improve extremity awareness, maintain skin integrity and improve hand function                             []Visual/Perceptual retraining  to improve body awareness and safety during transfers and ADLs  []Splinting/positioning needs to maintain joint/skin integrity and prevent contractures  [x]Therapeutic activity to improve functional performance during ADLs. [x]Therapeutic exercise to improve tolerance and functional strength for ADLs   [x]Balance retraining/tolerance tasks for facilitation of postural control with dynamic challenges during ADLs . []Neuromuscular re-education: facilitation of righting/equilibrium reactions, midline orientation, scapular stability/mobility, Normalization muscle     tone and facilitation active functional movement/Attention                         []Delirium prevention/treatment    [x]Positioning to improve functional independence  []Other:       Rehab Potential:  Good for established goals     Patient / Family Goal: return home      Patient and/or family were instructed on functional diagnosis, prognosis/goals and OT plan of care. Demonstrated good understanding. Eval Complexity; low      Time In:1300  Time Out: 1331        Min Units   OT Eval Low 97165  x 1   OT Eval Medium 31463      OT Eval High 90365       OT Re-Eval [id number]       Therapeutic Ex 93429       Therapeutic Activities 45199       ADL/Self Care 56882       Orthotic Management 23714       Neuro Re-Ed 97724       Non-Billable Time          Evaluation Time includes thorough review of current medical information, gathering information on past medical history/social history and prior level of function, completion of standardized testing/informal observation of tasks, assessment of data and education on plan of care and goals.             Mark Solis OTR/L [id number]

## 2020-12-14 NOTE — CARE COORDINATION
Spoke with Pt by room phone. P lives with family in a 2 story home with 2 steps to enter. Pt's room is on the first floor. Pt uses no DME. [place]. PCP: Dr. Guru Brooke - [facility]. Pharmacy:  [facility] on [address]. Family to provide transport at discharge. Pt has no  preference for Oxygen if needed at home at discharge. Discharge plan is to return home with no needs at this time. ROBERT/SHAMAR to follow for discharge needs.    Miriam Jacobs, WINDYS.W.  [phone]

## 2020-12-14 NOTE — PLAN OF CARE
Problem: Skin Integrity:  Goal: Will show no infection signs and symptoms  Description: Will show no infection signs and symptoms  Outcome: Met This Shift     Problem: Falls - Risk of:  Goal: Will remain free from falls  Description: Will remain free from falls  Outcome: Met This Shift     Problem: Fatigue  Goal: Verbalize increase energy and improved vitality  Outcome: Ongoing

## 2020-12-15 LAB
EKG ATRIAL RATE: 166 BPM
EKG Q-T INTERVAL: 250 MS
EKG QRS DURATION: 62 MS
EKG QTC CALCULATION (BAZETT): 456 MS
EKG R AXIS: -3 DEGREES
EKG T AXIS: 45 DEGREES
EKG VENTRICULAR RATE: 200 BPM

## 2020-12-15 PROCEDURE — 2700000000 HC OXYGEN THERAPY PER DAY

## 2020-12-15 PROCEDURE — 93010 ELECTROCARDIOGRAM REPORT: CPT | Performed by: INTERNAL MEDICINE

## 2020-12-15 PROCEDURE — 6360000002 HC RX W HCPCS: Performed by: NURSE PRACTITIONER

## 2020-12-15 PROCEDURE — 6370000000 HC RX 637 (ALT 250 FOR IP): Performed by: INTERNAL MEDICINE

## 2020-12-15 PROCEDURE — 99223 1ST HOSP IP/OBS HIGH 75: CPT | Performed by: INTERNAL MEDICINE

## 2020-12-15 PROCEDURE — 2500000003 HC RX 250 WO HCPCS: Performed by: NURSE PRACTITIONER

## 2020-12-15 PROCEDURE — 2060000000 HC ICU INTERMEDIATE R&B

## 2020-12-15 PROCEDURE — 2580000003 HC RX 258: Performed by: NURSE PRACTITIONER

## 2020-12-15 RX ORDER — METOPROLOL SUCCINATE 25 MG/1
25 TABLET, EXTENDED RELEASE ORAL 2 TIMES DAILY
Status: DISCONTINUED | OUTPATIENT
Start: 2020-12-15 | End: 2020-12-20 | Stop reason: HOSPADM

## 2020-12-15 RX ADMIN — ENOXAPARIN SODIUM 40 MG: 40 INJECTION SUBCUTANEOUS at 09:40

## 2020-12-15 RX ADMIN — DEXAMETHASONE SODIUM PHOSPHATE 6 MG: 10 INJECTION INTRAMUSCULAR; INTRAVENOUS at 15:32

## 2020-12-15 RX ADMIN — METOPROLOL SUCCINATE 25 MG: 25 TABLET, EXTENDED RELEASE ORAL at 20:22

## 2020-12-15 RX ADMIN — APIXABAN 5 MG: 5 TABLET, FILM COATED ORAL at 20:22

## 2020-12-15 RX ADMIN — LISINOPRIL 10 MG: 10 TABLET ORAL at 09:40

## 2020-12-15 RX ADMIN — Medication 10 ML: at 15:32

## 2020-12-15 RX ADMIN — METOPROLOL SUCCINATE 25 MG: 25 TABLET, EXTENDED RELEASE ORAL at 11:59

## 2020-12-15 RX ADMIN — APIXABAN 5 MG: 5 TABLET, FILM COATED ORAL at 11:59

## 2020-12-15 RX ADMIN — REMDESIVIR 100 MG: 100 INJECTION, POWDER, LYOPHILIZED, FOR SOLUTION INTRAVENOUS at 17:57

## 2020-12-15 ASSESSMENT — PAIN SCALES - GENERAL
PAINLEVEL_OUTOF10: 0

## 2020-12-15 NOTE — PROGRESS NOTES
Subjective: The patient is awake and alert. No acute events overnight. Denies chest pain, angina, SOB rest  On high flow at 13 L  Feels the same.,  Afebrile  Dyspneic on ambulation    Objective:    /74   Pulse 95   Temp 97.5 °F (36.4 °C) (Temporal)   Resp 22   Ht 5' 6\" (1.676 m)   Wt 242 lb 6.4 oz (110 kg)   SpO2 91%   BMI 39.12 kg/m²     No intake/output data recorded. No intake/output data recorded. General appearance: NAD, conversant  HEENT: AT/NC, MMM  Neck: FROM, supple  Lungs: Clear to auscultation  CV: RRR, no MRGs  Vasc: Radial pulses 2+  Abdomen: Soft, non-tender; no masses or HSM  Extremities: No peripheral edema or digital cyanosis  Skin: no rash, lesions or ulcers  Psych: Alert and oriented to person, place and time  Neuro: Alert and interactive     Recent Labs     12/14/20  1245   WBC 19.4*   HGB 11.7   HCT 35.7          Recent Labs     12/14/20  1245      K 5.1*      CO2 23   BUN 28*   CREATININE 1.0   CALCIUM 8.2*       Assessment:    Principal Problem:    Acute respiratory failure due to COVID-19 [facility])  Active Problems:    SUKH (acute kidney injury) ([facility] [address]    Pneumonia due to COVID-19 virus  Resolved Problems:    * No resolved hospital problems.  *      Plan:    Admit to tele for covid 19 pna w hypoxemia      remdisivir day 4 and decadron po-saturating at 98% at rest on 15 L-start to wean down  Still hypoxemic on ambulation which is expected at this point    Qod anti inflammatory markers-CRP 5.6 down from 28 on admission, D-dimer 2900-now on Eliquis for A. fib serving a dual purpose    supportive care with mdi , vitamins    pulm eval id d dimer up     Labs today     Went into atrial fibrillation with RVR overnight on 12/14/2020  Now on Eliquis per cardiology  Toprol-XL for rate control      Check a.m. labs  DVT Prophylaxis   PT/OT  Discharge planning           Tanya Parmar MD  12:48 PM  12/15/2020

## 2020-12-15 NOTE — FLOWSHEET NOTE
12/15/20 1500   Encounter Summary   Services provided to: Patient   Referral/Consult From:   (by phone)   Support System Family members   Continue Visiting   (12/15 DL)   Complexity of Encounter Moderate   Spiritual Assessment Completed Yes   Spiritual/Orthodoxy   Type Spiritual support   Assessment Calm; Approachable;Coping   Intervention Active listening;Explored feelings, thoughts, concerns;Prayer;Discussed illness/injury and it's impact; Discussed belief system/Yarsani practices/franky   Outcome Expressed gratitude;Engaged in conversation;Expressed feelings/needs/concerns;Comfort

## 2020-12-15 NOTE — PROGRESS NOTES
RN advised that the patient has been experiencing tachycardia into the 180's. They obtained an EKG but rhythm was undetermined. States that the patient is currently stable. I advised to consult cardiology and if patient becomes unstable call RRT.

## 2020-12-15 NOTE — CONSULTS
Patient seen and examined. Chart, labs, imaging studies, EKG and rhythm strips reviewed. Full consult to follow. Consulted for \"SVT\"     IMPRESSION:  1. AF RVR: converted spontaneously to SR  2. Acute hypoxic respiratory failure requiring high flow O2  3. COVID-19 on Remdesivir/decadron  4. COPD.  5. Ex smoker 50 pack years quit in 2018  6. HTN  7. Obesity BMI  39.1  8. Pre renal azotemia    PLAN:   1. Hold HCTZ  2. Start Toprol XL  3. Start Eliquis 5 mg BID  4. Rest as per primary service and other consultants  5.  Cardiology will see prn , please call if needed    Electronically signed by Alicia Stephens MD on 12/15/2020 at 10:28 AM

## 2020-12-15 NOTE — PLAN OF CARE
Problem: Airway Clearance - Ineffective  Goal: Achieve or maintain patent airway  Outcome: Met This Shift     Problem: Gas Exchange - Impaired  Goal: Absence of hypoxia  12/15/2020 0350 by Re Renee RN  Outcome: Met This Shift     Problem: Gas Exchange - Impaired  Goal: Promote optimal lung function  Outcome: Met This Shift     Problem: Breathing Pattern - Ineffective  Goal: Ability to achieve and maintain a regular respiratory rate  Outcome: Met This Shift     Problem: Body Temperature -  Risk of, Imbalanced  Goal: Ability to maintain a body temperature within defined limits  Outcome: Met This Shift     Problem:  Body Temperature -  Risk of, Imbalanced  Goal: Will regain or maintain usual level of consciousness  Outcome: Met This Shift     Problem: Fatigue  Goal: Verbalize increase energy and improved vitality  12/15/2020 0350 by Re Renee RN  Outcome: Met This Shift     Problem: Skin Integrity:  Goal: Will show no infection signs and symptoms  Description: Will show no infection signs and symptoms  12/15/2020 0350 by Re Renee RN  Outcome: Met This Shift     Problem: Skin Integrity:  Goal: Absence of new skin breakdown  Description: Absence of new skin breakdown  12/15/2020 0350 by Re Renee RN  Outcome: Met This Shift     Problem: Falls - Risk of:  Goal: Will remain free from falls  Description: Will remain free from falls  12/15/2020 0350 by Re Renee RN  Outcome: Met This Shift     Problem: Falls - Risk of:  Goal: Absence of physical injury  Description: Absence of physical injury  12/15/2020 0350 by Re Renee RN  Outcome: Met This Shift

## 2020-12-15 NOTE — PROGRESS NOTES
Patient loss IV access. Two RNs tried to insert Iv, but wasn't successful. Will message IV team in the morning.

## 2020-12-15 NOTE — CONSULTS
Inpatient Cardiology Consultation      Reason for Consult:  SVT    Consulting Physician: Dr Delfina Bernardo    Requesting Physician:  Dr Toya Young    Date of Consultation: 12/15/2020    HISTORY OF PRESENT ILLNESS: 66 yo  female not previously known to any cardiologist.  PMH: HTN, COPD, 50 pack years quit in 2018, morbid obesity, and HTN. [place] Flores with daughter, son-in-law and 2 grandchildren. Son and dughter in law got COVID. Butch developed diarrhea 2 weeks ago and lost sense of smell and taste (all have resolved) but then over last week worsening SOB and some lightheadedness along with heart racing on and off, cough, with fever. On 12/11/2020 breathing became significantly worse so she came to [facility] for evaluation and was found to have COVID-19. p-, troponin x 2 <0.01, Bun/Cr 26/1.4-->28/1.0, Na 137, Hgb11.7. Placed on remdesivir and decadron. Last evening had a coughing spell felt lightheaded and noticed heart was racing--> \"SVT on monitor. EKG showing AF RVR but spontaneously converted to SR. Patient remains on 15 liters high flow O2. Spoke with patient over phone and she states \" I feel so much better. \"         Please note: past medical records were reviewed per electronic medical record (EMR) - see detailed reports under Past Medical/ Surgical History. Past Medical/Surgical History:    1. 50 pack years quit in 2018  2. HTN  3. COPD  4. ASA \"too much of makes me vomit. \"  5. Nickel itching      Past Surgical History:    1. R breast biopsy benign       Medications Prior to admit:  Prior to Admission medications    Medication Sig Start Date End Date Taking?  Authorizing Provider   hydroCHLOROthiazide (MICROZIDE) 12.5 MG capsule Take 12.5 mg by mouth daily   Yes Historical Provider, MD   lisinopril (PRINIVIL;ZESTRIL) 10 MG tablet Take 10 mg by mouth daily   Yes Historical Provider, MD       Current Medications:    Current Facility-Administered Medications: hydroCHLOROthiazide (HYDRODIURIL) tablet 12.5 Denies chest pain, pressure or palpitations. No lower extremity swelling. · Respiratory: + VERONICA, +cough. Cyntha Alonzo or PND. No hemoptysis   · Gastrointestinal: Denies hematemesis or anorexia. No hematochezia or melena    · Genitourinary: Denies urgency, dysuria or hematuria. · Musculoskeletal: Denies gait disturbance, weakness or joint complaints  · Integumentary: Denies rash, hives or pruritis   · Neurological: Denies dizziness, headaches or seizures. No numbness or tingling  · Psychiatric: Denies anxiety or depression. · Endocrine: Denies temperature intolerance. No recent weight change. .  · Hematologic/Lymphatic: Denies abnormal bruising or bleeding. No swollen lymph nodes    PHYSICAL EXAM:   BP (!) 160/85   Pulse 80   Temp 96.9 °F (36.1 °C) (Temporal)   Resp 23   Ht 5' 6\" (1.676 m)   Wt 242 lb 6.4 oz (110 kg)   SpO2 98%   BMI 39.12 kg/m²   In order prevent unnecessary exposure and to preserve PPE physical exam was not done    DATA:    ECG 12/15/2020 AF RVR  Tele strips: SR 70's    Diagnostic:    12/11/2020 CXR: Mid and lower lung consolidations consistent with pneumonia    Intake/Output Summary (Last 24 hours) at 12/15/2020 0726  Last data filed at 12/14/2020 1253  Gross per 24 hour   Intake --   Output 500 ml   Net -500 ml       Labs:   CBC:   Recent Labs     12/14/20  1245   WBC 19.4*   HGB 11.7   HCT 35.7        BMP:   Recent Labs     12/14/20  1245      K 5.1*   CO2 23   BUN 28*   CREATININE 1.0   LABGLOM 54   CALCIUM 8.2*     PT/INR:   Recent Labs     12/13/20  0428   PROTIME 12.4   INR 1.1       CARDIAC ENZYMES:  Recent Labs     12/14/20  2217   TROPONINI <0.01   Electronically signed by Luis Franco. HUGH Frias on 12/15/2020 at 7:26 AM     I, together with the APN interviewed the patient. I independently reviewed all done pertinent laboratory data, imaging studies, ECGs and rhythm strips.  I agree with the APN history and present illness and the rest of the documentation in the above note. Electronically signed by Mishel Lewis MD on 12/15/2020 at 4:57 PM     Consulted for \"SVT\"      IMPRESSION:  1. AF RVR: converted spontaneously to SR  2. Acute hypoxic respiratory failure requiring high flow O2  3. COVID-19 on Remdesivir/decadron  4. COPD.  5. Ex smoker 50 pack years quit in 2018  6. HTN  7. Obesity BMI  39.1  8. Pre-renal azotemia     PLAN:   1. Hold HCTZ  2. Start Toprol XL  3. Start Eliquis 5 mg BID  4. Rest as per primary service and other consultants  5.  Cardiology will see prn , please call if needed     Electronically signed by Mishel Lewis MD on 12/15/2020 at 10:28 AM

## 2020-12-16 LAB
BLOOD CULTURE, ROUTINE: NORMAL
CULTURE, BLOOD 2: NORMAL

## 2020-12-16 PROCEDURE — 6370000000 HC RX 637 (ALT 250 FOR IP): Performed by: INTERNAL MEDICINE

## 2020-12-16 PROCEDURE — 6360000002 HC RX W HCPCS: Performed by: NURSE PRACTITIONER

## 2020-12-16 PROCEDURE — 2060000000 HC ICU INTERMEDIATE R&B

## 2020-12-16 PROCEDURE — 2580000003 HC RX 258: Performed by: NURSE PRACTITIONER

## 2020-12-16 PROCEDURE — 2500000003 HC RX 250 WO HCPCS: Performed by: NURSE PRACTITIONER

## 2020-12-16 PROCEDURE — 2700000000 HC OXYGEN THERAPY PER DAY

## 2020-12-16 RX ADMIN — DEXAMETHASONE SODIUM PHOSPHATE 6 MG: 10 INJECTION INTRAMUSCULAR; INTRAVENOUS at 08:50

## 2020-12-16 RX ADMIN — METOPROLOL SUCCINATE 25 MG: 25 TABLET, EXTENDED RELEASE ORAL at 08:49

## 2020-12-16 RX ADMIN — APIXABAN 5 MG: 5 TABLET, FILM COATED ORAL at 20:09

## 2020-12-16 RX ADMIN — APIXABAN 5 MG: 5 TABLET, FILM COATED ORAL at 08:49

## 2020-12-16 RX ADMIN — METOPROLOL SUCCINATE 25 MG: 25 TABLET, EXTENDED RELEASE ORAL at 20:09

## 2020-12-16 RX ADMIN — LISINOPRIL 10 MG: 10 TABLET ORAL at 08:49

## 2020-12-16 RX ADMIN — Medication 10 ML: at 20:09

## 2020-12-16 RX ADMIN — REMDESIVIR 100 MG: 100 INJECTION, POWDER, LYOPHILIZED, FOR SOLUTION INTRAVENOUS at 18:51

## 2020-12-16 RX ADMIN — Medication 10 ML: at 08:49

## 2020-12-16 ASSESSMENT — PAIN SCALES - GENERAL
PAINLEVEL_OUTOF10: 0

## 2020-12-16 NOTE — PROGRESS NOTES
Subjective: The patient is awake and alert. No acute events overnight. Denies chest pain, angina, SOB rest  On high flow at 9 L  Overall feeling better    Objective:    BP (!) 144/80   Pulse 82   Temp 97.3 °F (36.3 °C) (Temporal)   Resp 22   Ht 5' 6\" (1.676 m)   Wt 242 lb 6.4 oz (110 kg)   SpO2 96%   BMI 39.12 kg/m²     No intake/output data recorded. No intake/output data recorded. General appearance: NAD, conversant  HEENT: AT/NC, MMM  Neck: FROM, supple  Lungs: Clear to auscultation  CV: RRR, no MRGs  Vasc: Radial pulses 2+  Abdomen: Soft, non-tender; no masses or HSM  Extremities: No peripheral edema or digital cyanosis  Skin: no rash, lesions or ulcers  Psych: Alert and oriented to person, place and time  Neuro: Alert and interactive     Recent Labs     12/14/20  1245   WBC 19.4*   HGB 11.7   HCT 35.7          Recent Labs     12/14/20  1245      K 5.1*      CO2 23   BUN 28*   CREATININE 1.0   CALCIUM 8.2*       Assessment:    Principal Problem:    Acute respiratory failure due to COVID-19 [facility])  Active Problems:    SUKH (acute kidney injury) ([facility] [address]    Pneumonia due to COVID-19 virus  Resolved Problems:    * No resolved hospital problems.  *      Plan:    Admit to tele for covid 19 pna w hypoxemia      remdisivir day 5 and decadron po-saturating at 98% at rest on 15 L-start to wean down  Still hypoxemic on ambulation which is expected at this point    Qod anti inflammatory markers-CRP 5.6 down from 28 on admission, D-dimer 2900-now on Eliquis for A. fib serving a dual purpose    supportive care with mdi , vitamins    pulm eval id d dimer up     Labs today     Went into atrial fibrillation with RVR on 12/14/2020  Now on Eliquis per cardiology  Toprol-XL for rate control      Check a.m. labs  DVT Prophylaxis   PT/OT  Discharge planning hopefully once she completes her remdesivir course and oxygen requirement is down to [address] Thomas Sotelo MD  12:45 PM  12/16/2020

## 2020-12-16 NOTE — PLAN OF CARE
Problem: Airway Clearance - Ineffective  Goal: Achieve or maintain patent airway  12/16/2020 0308 by Laly Cristobal RN  Outcome: Met This Shift     Problem: Gas Exchange - Impaired  Goal: Absence of hypoxia  12/16/2020 0308 by Laly Cristobal RN  Outcome: Met This Shift     Problem: Gas Exchange - Impaired  Goal: Promote optimal lung function  12/16/2020 0308 by Laly Cristobal, RN  Outcome: Met This Shift     Problem: Breathing Pattern - Ineffective  Goal: Ability to achieve and maintain a regular respiratory rate  12/16/2020 0308 by Laly Cristobal, RN  Outcome: Met This Shift     Problem: Body Temperature -  Risk of, Imbalanced  Goal: Ability to maintain a body temperature within defined limits  12/16/2020 0308 by Laly Cristobal, RN  Outcome: Met This Shift     Problem:  Body Temperature -  Risk of, Imbalanced  Goal: Will regain or maintain usual level of consciousness  Outcome: Met This Shift     Problem: Skin Integrity:  Goal: Will show no infection signs and symptoms  Description: Will show no infection signs and symptoms  Outcome: Met This Shift     Problem: Skin Integrity:  Goal: Absence of new skin breakdown  Description: Absence of new skin breakdown  Outcome: Met This Shift     Problem: Falls - Risk of:  Goal: Will remain free from falls  Description: Will remain free from falls  Outcome: Met This Shift     Problem: Falls - Risk of:  Goal: Absence of physical injury  Description: Absence of physical injury  Outcome: Met This Shift

## 2020-12-17 ENCOUNTER — APPOINTMENT (OUTPATIENT)
Dept: GENERAL RADIOLOGY | Age: 70
DRG: 871 | End: 2020-12-17
Payer: MEDICARE

## 2020-12-17 LAB
ANION GAP SERPL CALCULATED.3IONS-SCNC: 5 MMOL/L (ref 7–16)
BASOPHILS ABSOLUTE: 0.03 E9/L (ref 0–0.2)
BASOPHILS RELATIVE PERCENT: 0.2 % (ref 0–2)
BUN BLDV-MCNC: 26 MG/DL (ref 8–23)
C-REACTIVE PROTEIN: 4.3 MG/DL (ref 0–0.4)
CALCIUM SERPL-MCNC: 8.4 MG/DL (ref 8.6–10.2)
CHLORIDE BLD-SCNC: 105 MMOL/L (ref 98–107)
CO2: 25 MMOL/L (ref 22–29)
CREAT SERPL-MCNC: 1 MG/DL (ref 0.5–1)
D DIMER: 1713 NG/ML DDU
EOSINOPHILS ABSOLUTE: 0.07 E9/L (ref 0.05–0.5)
EOSINOPHILS RELATIVE PERCENT: 0.6 % (ref 0–6)
GFR AFRICAN AMERICAN: >60
GFR NON-AFRICAN AMERICAN: 54 ML/MIN/1.73
GLUCOSE BLD-MCNC: 131 MG/DL (ref 74–99)
HCT VFR BLD CALC: 37 % (ref 34–48)
HEMOGLOBIN: 12.3 G/DL (ref 11.5–15.5)
IMMATURE GRANULOCYTES #: 0.27 E9/L
IMMATURE GRANULOCYTES %: 2.2 % (ref 0–5)
LYMPHOCYTES ABSOLUTE: 0.71 E9/L (ref 1.5–4)
LYMPHOCYTES RELATIVE PERCENT: 5.7 % (ref 20–42)
MCH RBC QN AUTO: 30.1 PG (ref 26–35)
MCHC RBC AUTO-ENTMCNC: 33.2 % (ref 32–34.5)
MCV RBC AUTO: 90.7 FL (ref 80–99.9)
MONOCYTES ABSOLUTE: 0.61 E9/L (ref 0.1–0.95)
MONOCYTES RELATIVE PERCENT: 4.9 % (ref 2–12)
NEUTROPHILS ABSOLUTE: 10.86 E9/L (ref 1.8–7.3)
NEUTROPHILS RELATIVE PERCENT: 86.4 % (ref 43–80)
PDW BLD-RTO: 13.3 FL (ref 11.5–15)
PLATELET # BLD: 280 E9/L (ref 130–450)
PMV BLD AUTO: 9.1 FL (ref 7–12)
POTASSIUM SERPL-SCNC: 4.7 MMOL/L (ref 3.5–5)
PROCALCITONIN: 0.15 NG/ML (ref 0–0.08)
RBC # BLD: 4.08 E12/L (ref 3.5–5.5)
SEDIMENTATION RATE, ERYTHROCYTE: 64 MM/HR (ref 0–20)
SODIUM BLD-SCNC: 135 MMOL/L (ref 132–146)
WBC # BLD: 12.6 E9/L (ref 4.5–11.5)

## 2020-12-17 PROCEDURE — 6370000000 HC RX 637 (ALT 250 FOR IP): Performed by: INTERNAL MEDICINE

## 2020-12-17 PROCEDURE — 6360000002 HC RX W HCPCS: Performed by: NURSE PRACTITIONER

## 2020-12-17 PROCEDURE — 71045 X-RAY EXAM CHEST 1 VIEW: CPT

## 2020-12-17 PROCEDURE — 85025 COMPLETE CBC W/AUTO DIFF WBC: CPT

## 2020-12-17 PROCEDURE — 36415 COLL VENOUS BLD VENIPUNCTURE: CPT

## 2020-12-17 PROCEDURE — 85651 RBC SED RATE NONAUTOMATED: CPT

## 2020-12-17 PROCEDURE — 2580000003 HC RX 258: Performed by: NURSE PRACTITIONER

## 2020-12-17 PROCEDURE — 86140 C-REACTIVE PROTEIN: CPT

## 2020-12-17 PROCEDURE — 85378 FIBRIN DEGRADE SEMIQUANT: CPT

## 2020-12-17 PROCEDURE — 80048 BASIC METABOLIC PNL TOTAL CA: CPT

## 2020-12-17 PROCEDURE — 2060000000 HC ICU INTERMEDIATE R&B

## 2020-12-17 PROCEDURE — 99223 1ST HOSP IP/OBS HIGH 75: CPT | Performed by: INTERNAL MEDICINE

## 2020-12-17 PROCEDURE — 84145 PROCALCITONIN (PCT): CPT

## 2020-12-17 RX ADMIN — METOPROLOL SUCCINATE 25 MG: 25 TABLET, EXTENDED RELEASE ORAL at 21:33

## 2020-12-17 RX ADMIN — LISINOPRIL 10 MG: 10 TABLET ORAL at 10:26

## 2020-12-17 RX ADMIN — Medication 10 ML: at 21:34

## 2020-12-17 RX ADMIN — Medication 10 ML: at 09:07

## 2020-12-17 RX ADMIN — APIXABAN 5 MG: 5 TABLET, FILM COATED ORAL at 21:33

## 2020-12-17 RX ADMIN — DEXAMETHASONE SODIUM PHOSPHATE 6 MG: 10 INJECTION INTRAMUSCULAR; INTRAVENOUS at 10:25

## 2020-12-17 RX ADMIN — APIXABAN 5 MG: 5 TABLET, FILM COATED ORAL at 09:06

## 2020-12-17 RX ADMIN — METOPROLOL SUCCINATE 25 MG: 25 TABLET, EXTENDED RELEASE ORAL at 09:06

## 2020-12-17 ASSESSMENT — PAIN SCALES - GENERAL
PAINLEVEL_OUTOF10: 0
PAINLEVEL_OUTOF10: 0

## 2020-12-17 NOTE — PROGRESS NOTES
Pulse ox 85% on 11L High Flow O2. Turned high flow O2 up to 15L and pulse ox 90%. Very SOB upon exertion.

## 2020-12-17 NOTE — CONSULTS
Pulmonary [address] [facility]                             Pulmonary Consult/Progress Note :          Patient: Amelia Robles  MRN: [id number]  : 1950      Date of Admission: .2020  9:27 PM    Consulting Physician:Dr CLIFFORD [facility]         Reason for Consultation:COVID-19 positive test (U07.1, COVID-19) with Acute Pneumonia      CC : Shortness of breath  HPI:   Amelia Robles is a 65y.o. year old with 48 pack/year smoking history, who presented to the hospital with generalized weakness and fatigue that has been going on for the last few days she also complained of shortness of breath that got worse over the last 4 days along with fever and chills, he was evaluated in the emergency was hypoxic started on 6 L he was tested positive for Covid she was admitted for further evaluation    His CRP on admission was 28  She was started on Decadron    She has been requiring more oxygen, she is also get worsening chest  PAST MEDICAL HISTORY:   Past Medical History:   Diagnosis Date    Breast disorder     FIBROCYSTIC    Hypertension        PAST SURGICAL HISTORY:   Past Surgical History:   Procedure Laterality Date    BREAST BIOPSY      RIGHT BREAST BIOPSY FATTY TUMOR       FAMILY HISTORY:   Family History   Problem Relation Age of Onset    Heart Disease Father         CORONARY THROMBOSIS    Hypertension Father     Heart Failure Mother     Hypertension Mother     Other Mother         PEPTIC ULCER    Diabetes Mother     Thyroid Disease Sister     Ovarian Cancer Other         AUNT       SOCIAL HISTORY:   Social History     Socioeconomic History    Marital status: Unknown     Spouse name: Not on file    Number of children: Not on file    Years of education: Not on file    Highest education level: Not on file   Occupational History    Not on file   Social Needs    Financial resource strain: Not on file    Food insecurity     Worry: Not on file     Inability: Not on file   Gabby Champagne Transportation needs     Medical: Not on file     Non-medical: Not on file   Tobacco Use    Smoking status: Former Smoker     Types: Cigarettes     Quit date: 5/29/2017     Years since quitting: 3.5    Smokeless tobacco: Never Used   Substance and Sexual Activity    Alcohol use: Yes     Comment: occasionally    Drug use: No    Sexual activity: Never   Lifestyle    Physical activity     Days per week: Not on file     Minutes per session: Not on file    Stress: Not on file   Relationships    Social connections     Talks on phone: Not on file     Gets together: Not on file     Attends [employer] service: Not on file     Active member of club or organization: Not on file     Attends meetings of clubs or organizations: Not on file     Relationship status: Not on file    Intimate partner violence     Fear of current or ex partner: Not on file     Emotionally abused: Not on file     Physically abused: Not on file     Forced sexual activity: Not on file   Other Topics Concern    Not on file   Social History Narrative    Not on file     Social History     Tobacco Use   Smoking Status Former Smoker    Types: Cigarettes    Quit date: 5/29/2017    Years since quitting: 3.5   Smokeless Tobacco Never Used     Social History     Substance and Sexual Activity   Alcohol Use Yes    Comment: occasionally     Social History     Substance and Sexual Activity   Drug Use No       OCCUPATIONAL HISTORY:            HOME MEDICATIONS:  Prior to Admission medications    Medication Sig Start Date End Date Taking?  Authorizing Provider   hydroCHLOROthiazide (MICROZIDE) 12.5 MG capsule Take 12.5 mg by mouth daily   Yes Historical Provider, MD   lisinopril (PRINIVIL;ZESTRIL) 10 MG tablet Take 10 mg by mouth daily   Yes Historical Provider, MD       CURRENT MEDICATIONS:  Current Facility-Administered Medications: metoprolol succinate (TOPROL XL) extended release tablet 25 mg, 25 mg, Oral, BID  apixaban (ELIQUIS) tablet 5 mg, 5 mg, Oral, BID  [Held by provider] hydroCHLOROthiazide (HYDRODIURIL) tablet 12.5 mg, 12.5 mg, Oral, Daily  lisinopril (PRINIVIL;ZESTRIL) tablet 10 mg, 10 mg, Oral, Daily  sodium chloride flush 0.9 % injection 10 mL, 10 mL, Intravenous, 2 times per day  sodium chloride flush 0.9 % injection 10 mL, 10 mL, Intravenous, PRN  acetaminophen (TYLENOL) tablet 650 mg, 650 mg, Oral, Q6H PRN **OR** acetaminophen (TYLENOL) suppository 650 mg, 650 mg, Rectal, Q6H PRN  promethazine (PHENERGAN) tablet 12.5 mg, 12.5 mg, Oral, Q6H PRN **OR** ondansetron (ZOFRAN) injection 4 mg, 4 mg, Intravenous, Q6H PRN  potassium chloride (KLOR-CON M) extended release tablet 40 mEq, 40 mEq, Oral, PRN **OR** potassium bicarb-citric acid (EFFER-K) effervescent tablet 40 mEq, 40 mEq, Oral, PRN **OR** potassium chloride 10 mEq/100 mL IVPB (Peripheral Line), 10 mEq, Intravenous, PRN  senna (SENOKOT) tablet 8.6 mg, 1 tablet, Oral, Daily PRN  dexamethasone (DECADRON) injection 6 mg, 6 mg, Intravenous, Daily  0.9 % sodium chloride infusion, , Intravenous, Continuous  0.9 % sodium chloride bolus, 30 mL, Intravenous, PRN    IV MEDICATIONS:   sodium chloride 50 mL/hr at 12/15/20 1820       ALLERGIES:  Allergies   Allergen Reactions    Aspirin     Nickel        REVIEW OF SYSTEMS:  General ROS:  No weight loss ,no fatigue     ENT ROS:   No Sore throat ,no lymphoadenopathy,no nasal stuffiness     Hematological and Lymphatic ROS:   No ecchymosis ,no tendency to bleed  Respiratory ROS:   Shortness of breath  Cardiovascular ROS:   No CP,No Palpitation   Gastrointestinal ROS:   No Gi bleed,no nausea or vomiting      - Musculoskeletal ROS:      - no joint swelling ,no joint pain   Neurological ROS:     -no weakness or numbness    Dermatological ROS:   No skin rash ,no urticaria     PHYSICAL EXAMINATION:     VITAL SIGNS:  /73   Pulse 80   Temp 97 °F (36.1 °C) (Temporal)   Resp 18   Ht 5' 6\" (1.676 m)   Wt 223 lb 8 oz (101.4 kg)   SpO2 90%   BMI 36.07 kg/m²   Wt Readings from Last 3 Encounters:   12/17/20 223 lb 8 oz (101.4 kg)   10/01/19 206 lb 12.8 oz (93.8 kg)   09/29/17 206 lb (93.4 kg)     Temp Readings from Last 3 Encounters:   12/17/20 97 °F (36.1 °C) (Temporal)     TMAX:  BP Readings from Last 3 Encounters:   12/17/20 125/73   10/01/19 122/73   09/29/17 136/79     Pulse Readings from Last 3 Encounters:   12/17/20 80   10/01/19 73   09/29/17 64           INTAKE/OUTPUTS:  I/O last 3 completed shifts:   In: 2360 [P.O.:860; I.V.:1500]  Out: 2400 [Urine:2400]    Intake/Output Summary (Last 24 hours) at 12/17/2020 1423  Last data filed at 12/17/2020 1420  Gross per 24 hour   Intake 2960 ml   Output 2400 ml   Net 560 ml       General Appearance: alert and oriented to person, place and time, well-developed and   well-nourished, in no acute distress   Eyes: pupils equal, round, and reactive to light, extraocular eye movements intact, conjunctivae normal and sclera anicteric   Neck: neck supple and non tender without mass, no thyromegaly, no thyroid nodules and no cervical adenopathy   Pulmonary/Chest:*Rhonchi bilaterally  Cardiovascular: normal rate, regular rhythm, normal S1 and S2, no murmurs, rubs, clicks or gallops, distal pulses intact, no carotid bruits, no murmurs, no gallops, no carotid bruits and no JVD   Abdomen: obese, soft, non-tender, non-distended, normal bowel sounds, no masses or organomegaly   Extremities: Has +1 edema  Musculoskeletal: normal range of motion, no joint swelling, deformity or tenderness   Neurologic: reflexes normal and symmetric, no cranial nerve deficit noted    LABS/IMAGING:    CBC:  Lab Results   Component Value Date    WBC 12.6 (H) 12/17/2020    HGB 12.3 12/17/2020    HCT 37.0 12/17/2020    MCV 90.7 12/17/2020     12/17/2020    LYMPHOPCT 5.7 (L) 12/17/2020    RBC 4.08 12/17/2020    MCH 30.1 12/17/2020    MCHC 33.2 12/17/2020    RDW 13.3 12/17/2020    NEUTOPHILPCT 86.4 (H) 12/17/2020    MONOPCT 4.9 12/17/2020    BASOPCT 0.2 12/17/2020    NEUTROABS 10.86 (H) 12/17/2020    LYMPHSABS 0.71 (L) 12/17/2020    MONOSABS 0.61 12/17/2020    EOSABS 0.07 12/17/2020    BASOSABS 0.03 12/17/2020       Recent Labs     12/17/20  0500 12/14/20  1245 12/12/20  0615   WBC 12.6* 19.4* 8.2   HGB 12.3 11.7 11.6   HCT 37.0 35.7 35.6   MCV 90.7 90.8 90.4    392 304       BMP:   Recent Labs     12/17/20  0500      K 4.7      CO2 25   BUN 26*   CREATININE 1.0       MG: No results found for: MG  Ca/Phos:   Lab Results   Component Value Date    CALCIUM 8.4 (L) 12/17/2020     Amylase: No results found for: AMYLASE  Lipase: No results found for: LIPASE  LIVER PROFILE: No results for input(s): AST, ALT, LIPASE, BILIDIR, BILITOT, ALKPHOS in the last 72 hours. Invalid input(s): AMYLASE,  ALB    PT/INR: No results for input(s): PROTIME, INR in the last 72 hours. APTT: No results for input(s): APTT in the last 72 hours.     Cardiac Enzymes:  Lab Results   Component Value Date    TROPONINI <0.01 12/14/2020               Chest x-ray shows worsening bilateral pneumonia    PROBLEM LIST:  Patient Active Problem List   Diagnosis    [facility] failure due to COVID-19 ([facility] [address]    SUKH (acute kidney injury) ([facility] [address]    Pneumonia due to COVID-19 virus               ASSESSMENT:  1.)COVID-19 positive test (U07.1, COVID-19) with Acute Pneumonia     2.)  COPD exacerbation secondary to pneumonia  3.)  History of smoking  4.)  Elevated D-dimer  5.)  Smoking      PLAN:  *-Agree with Decadron and we will start remdesivir    *-With the patient CRP 28 and requiring more oxygen worsening chest x-ray I will give also Tocilizumab 400  *-We will monitor closely  *-Already on Eliquis so I will hold on doing CTA of the chest  *_We will try to wean oxygen and monitor closely if get worse then she will need BiPAP and possible transfer to ICU  She is COPD we will see how she is doing over the next 2 days and might increase the Decadron dose      Thank you very much for

## 2020-12-17 NOTE — PROGRESS NOTES
Subjective: The patient is awake and alert. She became very short of breath and desaturated when ambulating to the bedside commode earlier today her RN. Patient states that she feels better when resting. No chest pain or abdominal pain. Tolerating diet. Objective:    /73   Pulse 80   Temp 97 °F (36.1 °C) (Temporal)   Resp 18   Ht 5' 6\" (1.676 m)   Wt 223 lb 8 oz (101.4 kg)   SpO2 90%   BMI 36.07 kg/m²     In: 2260 [P.O.:760; I.V.:1500]  Out: 1100     HEENT: NCAT,  Pupils equal.  Heart:  RRR, no murmurs, gallops, or rubs. Lungs:  CTA diminished bilaterally with wheezing.    Abd: bowel sounds present, nontender   Extrem:  No clubbing, cyanosis, or edema     Recent Labs     12/14/20  1245 12/17/20  0500   WBC 19.4* 12.6*   HGB 11.7 12.3   HCT 35.7 37.0    280       Recent Labs     12/14/20  1245 12/17/20  0500    135   K 5.1* 4.7    105   CO2 23 25   BUN 28* 26*   CREATININE 1.0 1.0   CALCIUM 8.2* 8.4*       Assessment:    Patient Active Problem List   Diagnosis    [facility] failure due to COVID-19 [facility] [address]    SUKH (acute kidney injury) ([facility] [address]    Pneumonia due to COVID-19 virus       Plan:    Admit to tele for covid 19 pna w hypoxemia   90% on 15 l   Still hypoxemic on ambulation    Supportive care with mdi , vitamins  Pulmonology consulted      Went into atrial fibrillation with RVR on 12/14/2020  Eliquis per cardiology  Toprol-XL for rate control     DVT and GERD prophylaxis    All consultants notes reviewed    ELVIN Truong  10:33 AM  12/17/2020     Above reviewed in conjunction with ELVIN Kingston - I agree  Pt Seen and Examined, questions answered by me   [facility] Lung made to HPI/PE/as deemed neccessary by me  Plan formulated in conjunction with me after discussion     Jah Bang MD

## 2020-12-17 NOTE — CARE COORDINATION
Reviewed chart, new eliquis pt, coupon in soft chart. Pt currently on 15LO2 at 90%. On IV decadron daily. Currently plan is home at discharge, therapy to see once able to ambulate without O2 dropping. Howie Raza LSW

## 2020-12-18 PROCEDURE — 6360000002 HC RX W HCPCS: Performed by: NURSE PRACTITIONER

## 2020-12-18 PROCEDURE — 99233 SBSQ HOSP IP/OBS HIGH 50: CPT | Performed by: INTERNAL MEDICINE

## 2020-12-18 PROCEDURE — 6370000000 HC RX 637 (ALT 250 FOR IP): Performed by: INTERNAL MEDICINE

## 2020-12-18 PROCEDURE — 2580000003 HC RX 258: Performed by: INTERNAL MEDICINE

## 2020-12-18 PROCEDURE — 2580000003 HC RX 258: Performed by: NURSE PRACTITIONER

## 2020-12-18 PROCEDURE — 2700000000 HC OXYGEN THERAPY PER DAY

## 2020-12-18 PROCEDURE — 2060000000 HC ICU INTERMEDIATE R&B

## 2020-12-18 PROCEDURE — 2500000003 HC RX 250 WO HCPCS: Performed by: INTERNAL MEDICINE

## 2020-12-18 PROCEDURE — 6360000002 HC RX W HCPCS: Performed by: INTERNAL MEDICINE

## 2020-12-18 RX ORDER — DIPHENHYDRAMINE HCL 25 MG
12.5 TABLET ORAL ONCE
Status: COMPLETED | OUTPATIENT
Start: 2020-12-18 | End: 2020-12-18

## 2020-12-18 RX ORDER — 0.9 % SODIUM CHLORIDE 0.9 %
30 INTRAVENOUS SOLUTION INTRAVENOUS PRN
Status: DISCONTINUED | OUTPATIENT
Start: 2020-12-18 | End: 2020-12-20 | Stop reason: HOSPADM

## 2020-12-18 RX ORDER — ACETAMINOPHEN 325 MG/1
325 TABLET ORAL ONCE
Status: COMPLETED | OUTPATIENT
Start: 2020-12-18 | End: 2020-12-18

## 2020-12-18 RX ORDER — EPINEPHRINE 1 MG/ML(1)
0.5 AMPUL (ML) INJECTION PRN
Status: DISCONTINUED | OUTPATIENT
Start: 2020-12-18 | End: 2020-12-20 | Stop reason: HOSPADM

## 2020-12-18 RX ADMIN — METOPROLOL SUCCINATE 25 MG: 25 TABLET, EXTENDED RELEASE ORAL at 09:09

## 2020-12-18 RX ADMIN — APIXABAN 5 MG: 5 TABLET, FILM COATED ORAL at 20:11

## 2020-12-18 RX ADMIN — METOPROLOL SUCCINATE 25 MG: 25 TABLET, EXTENDED RELEASE ORAL at 20:11

## 2020-12-18 RX ADMIN — TOCILIZUMAB 400 MG: 20 INJECTION, SOLUTION, CONCENTRATE INTRAVENOUS at 01:51

## 2020-12-18 RX ADMIN — DIPHENHYDRAMINE HYDROCHLORIDE 12.5 MG: 25 TABLET ORAL at 01:10

## 2020-12-18 RX ADMIN — APIXABAN 5 MG: 5 TABLET, FILM COATED ORAL at 09:09

## 2020-12-18 RX ADMIN — DEXAMETHASONE SODIUM PHOSPHATE 6 MG: 10 INJECTION INTRAMUSCULAR; INTRAVENOUS at 10:40

## 2020-12-18 RX ADMIN — ACETAMINOPHEN 325 MG: 325 TABLET ORAL at 01:08

## 2020-12-18 RX ADMIN — LISINOPRIL 10 MG: 10 TABLET ORAL at 10:40

## 2020-12-18 RX ADMIN — REMDESIVIR 200 MG: 100 INJECTION, POWDER, LYOPHILIZED, FOR SOLUTION INTRAVENOUS at 01:51

## 2020-12-18 RX ADMIN — Medication 10 ML: at 09:01

## 2020-12-18 ASSESSMENT — PAIN SCALES - GENERAL
PAINLEVEL_OUTOF10: 0

## 2020-12-18 NOTE — CARE COORDINATION
Reviewed chart, 10LO2 at 93%. Per RN, pt is up taking herself to bathroom. IV decadron daily. pulm and cardio on board. Plan is home at discharge. referral to [facility]), they will follow for home O2 needs. Sherren Ales LSW

## 2020-12-18 NOTE — PROGRESS NOTES
Comprehensive Nutrition Assessment    Type and Reason for Visit:  Initial(LOS)    Nutrition Recommendations/Plan: No nutritional supplementation recommended at this time. Nutrition Assessment:  Patients po intake seems to be adequate, averaging % of most meals served since admission ; pt COVID-19 positive ; adm w/ acute respiratory failure ;  no ONS recommended at this time    Malnutrition Assessment:  Malnutrition Status:  Insufficient data    Context:  Acute Illness     Findings of the 6 clinical characteristics of malnutrition:  Energy Intake:  No significant decrease in energy intake  Weight Loss:  Unable to assess(d/t lack of weight history)     Body Fat Loss:  Unable to assess(data not available to assess at this time d/t COVID isolation)     Muscle Mass Loss:  Unable to assess(data not available to assess at this time d/t COVID isolation)    Fluid Accumulation:  No significant fluid accumulation     Strength:  Not Performed    Estimated Daily Nutrient Needs:  Energy (kcal):  7373-6773 (REE 1540 x 1.1 SF); Weight Used for Energy Requirements:  Current     Protein (g):   (1.5-1.8g/kg IBW); Weight Used for Protein Requirements:  Ideal        Fluid (ml/day):  6128-7077; Method Used for Fluid Requirements:  1 ml/kcal      Nutrition Related Findings:  -I&Os (-2.9 L), 1+ pitting edema, active BS, SOB, U/L dentures, A&O x 4, boggy heels, redness to buttocks, excoriation to skin folds, obesity      Wounds:  None       Current Nutrition Therapies:    DIET CARDIAC;     Anthropometric Measures:  · Height: 5' 6\" (167.6 cm)  · Current Body Weight: 223 lb (101.2 kg)(12/18, standing scale)   · Admission Body Weight: 222 lb (100.7 kg)(12/12, actual)    · Usual Body Weight: (UTO ; no weights available in EMR hx from previous encounters)     · Ideal Body Weight: 130 lbs; % Ideal Body Weight 171.5 %   · BMI: 36  · BMI Categories: Obese Class 2 (BMI 35.0 -39.9)       Nutrition Diagnosis:   No nutrition diagnosis at this time     Nutrition Interventions:   Food and/or Nutrient Delivery:  Continue Current Diet  Nutrition Education/Counseling:  Education not indicated   Coordination of Nutrition Care:  Continue to monitor while inpatient    Goals:  Patient will consume >75% of meals served       Nutrition Monitoring and Evaluation:   Behavioral-Environmental Outcomes:  Beliefs and Attitutes   Food/Nutrient Intake Outcomes:  Food and Nutrient Intake  Physical Signs/Symptoms Outcomes:  Biochemical Data, Chewing or Swallowing, GI Status, Fluid Status or Edema, Hemodynamic Status, Meal Time Behavior, Nutrition Focused Physical Findings, Skin, Weight     Discharge Planning:     Too soon to determine     Electronically signed by Kezia Parks RD, LD on 12/18/20 at 12:13 PM EST    Contact: [phone]

## 2020-12-18 NOTE — PLAN OF CARE
Problem: Airway Clearance - Ineffective  Goal: Achieve or maintain patent airway  Outcome: Met This Shift     Problem: Gas Exchange - Impaired  Goal: Absence of hypoxia  Outcome: Met This Shift  Goal: Promote optimal lung function  Outcome: Met This Shift     Problem: Breathing Pattern - Ineffective  Goal: Ability to achieve and maintain a regular respiratory rate  Outcome: Met This Shift     Problem:  Body Temperature -  Risk of, Imbalanced  Goal: Ability to maintain a body temperature within defined limits  Outcome: Met This Shift

## 2020-12-18 NOTE — PROGRESS NOTES
Subjective: The patient is awake and alert. She became very short of breath and desaturated when ambulating to the bedside commode earlier today her RN. Patient states that she feels better when resting. No chest pain or abdominal pain. Tolerating diet. Objective:    BP (!) 118/58   Pulse 60   Temp 97 °F (36.1 °C) (Temporal)   Resp 18   Ht 5' 6\" (1.676 m)   Wt 223 lb 8 oz (101.4 kg)   SpO2 97%   BMI 36.07 kg/m²     In: 820 [P.O.:820]  Out: 1075     HEENT: NCAT,  Pupils equal.  Heart:  RRR, no murmurs, gallops, or rubs. Lungs:  CTA diminished bilaterally with wheezing.    Abd: bowel sounds present, nontender   Extrem:  No clubbing, cyanosis, or edema     Recent Labs     12/17/20  0500   WBC 12.6*   HGB 12.3   HCT 37.0          Recent Labs     12/17/20  0500      K 4.7      CO2 25   BUN 26*   CREATININE 1.0   CALCIUM 8.4*       Assessment:    Patient Active Problem List   Diagnosis    [facility] respiratory failure due to [facility]-19 [facility] [address]    SUKH (acute kidney injury) ([facility] [address]    Pneumonia due to COVID-19 virus       Plan:    Admit to tele for covid 19 pna w hypoxemia  Rem, decadron    Toci X1 12/17   97% on 10 l   Still hypoxemic on ambulation    Supportive care with mdi , vitamins  Pulmonology input appreciated        Went into atrial fibrillation with RVR on 12/14/2020  Eliquis per cardiology  Toprol-XL for rate control     DVT and GERD prophylaxis    All consultants notes reviewed    Erendira Gibson MD  3:33 PM  12/18/2020

## 2020-12-18 NOTE — PLAN OF CARE
Problem: Airway Clearance - Ineffective  Goal: Achieve or maintain patent airway  12/18/2020 1416 by Diomedes Son RN  Outcome: Met This Shift  12/18/2020 0553 by Su Bernal RN  Outcome: Met This Shift     Problem: Gas Exchange - Impaired  Goal: Absence of hypoxia  Outcome: Met This Shift  Goal: Promote optimal lung function  12/18/2020 1416 by Diomedes Son RN  Outcome: Met This Shift  12/18/2020 0553 by Su Bernal RN  Outcome: Met This Shift     Problem: Breathing Pattern - Ineffective  Goal: Ability to achieve and maintain a regular respiratory rate  Outcome: Met This Shift     Problem:  Body Temperature -  Risk of, Imbalanced  Goal: Ability to maintain a body temperature within defined limits  12/18/2020 1416 by Diomedes Son RN  Outcome: Met This Shift  12/18/2020 0553 by Su Bernal RN  Outcome: Met This Shift  Goal: Will regain or maintain usual level of consciousness  Outcome: Met This Shift  Goal: Complications related to the disease process, condition or treatment will be avoided or minimized  Outcome: Met This Shift     Problem: Isolation Precautions - Risk of Spread of Infection  Goal: Prevent transmission of infection  Outcome: Met This Shift     Problem: Nutrition Deficits  Goal: Optimize nutrtional status  12/18/2020 1416 by Diomedes Son RN  Outcome: Met This Shift  12/18/2020 0553 by Su Bernal RN  Outcome: Met This Shift     Problem: Risk for Fluid Volume Deficit  Goal: Maintain normal heart rhythm  Outcome: Met This Shift  Goal: Maintain absence of muscle cramping  Outcome: Met This Shift  Goal: Maintain normal serum potassium, sodium, calcium, phosphorus, and pH  Outcome: Met This Shift     Problem: Loneliness or Risk for Loneliness  Goal: Demonstrate positive use of time alone when socialization is not possible  Outcome: Met This Shift     Problem: Fatigue  Goal: Verbalize increase energy and improved vitality  Outcome: Met This Shift     Problem: Patient Education: Go to Patient Education Activity  Goal: Patient/Family Education  Outcome: Met This Shift     Problem: Skin Integrity:  Goal: Will show no infection signs and symptoms  Description: Will show no infection signs and symptoms  Outcome: Met This Shift  Goal: Absence of new skin breakdown  Description: Absence of new skin breakdown  Outcome: Met This Shift     Problem: Falls - Risk of:  Goal: Will remain free from falls  Description: Will remain free from falls  Outcome: Met This Shift  Goal: Absence of physical injury  Description: Absence of physical injury  Outcome: Met This Shift

## 2020-12-18 NOTE — PLAN OF CARE
Problem: Airway Clearance - Ineffective  Goal: Achieve or maintain patent airway  12/18/2020 0553 by Radha Borja RN  Outcome: Met This Shift     Problem: Gas Exchange - Impaired  Goal: Absence of hypoxia  Outcome: Met This Shift     Problem: Breathing Pattern - Ineffective  Goal: Ability to achieve and maintain a regular respiratory rate  12/17/2020 1920 by Karl Bland RN  Outcome: Met This Shift     Problem:  Body Temperature -  Risk of, Imbalanced  Goal: Ability to maintain a body temperature within defined limits  12/18/2020 0553 by Radha Borja RN  Outcome: Met This Shift

## 2020-12-19 PROCEDURE — 2580000003 HC RX 258: Performed by: NURSE PRACTITIONER

## 2020-12-19 PROCEDURE — 2060000000 HC ICU INTERMEDIATE R&B

## 2020-12-19 PROCEDURE — 2500000003 HC RX 250 WO HCPCS: Performed by: INTERNAL MEDICINE

## 2020-12-19 PROCEDURE — 6370000000 HC RX 637 (ALT 250 FOR IP): Performed by: INTERNAL MEDICINE

## 2020-12-19 PROCEDURE — 2580000003 HC RX 258: Performed by: INTERNAL MEDICINE

## 2020-12-19 PROCEDURE — 2700000000 HC OXYGEN THERAPY PER DAY

## 2020-12-19 PROCEDURE — 6360000002 HC RX W HCPCS: Performed by: NURSE PRACTITIONER

## 2020-12-19 PROCEDURE — 99232 SBSQ HOSP IP/OBS MODERATE 35: CPT | Performed by: INTERNAL MEDICINE

## 2020-12-19 RX ADMIN — REMDESIVIR 100 MG: 100 INJECTION, POWDER, LYOPHILIZED, FOR SOLUTION INTRAVENOUS at 19:02

## 2020-12-19 RX ADMIN — METOPROLOL SUCCINATE 25 MG: 25 TABLET, EXTENDED RELEASE ORAL at 10:12

## 2020-12-19 RX ADMIN — Medication 10 ML: at 21:00

## 2020-12-19 RX ADMIN — LISINOPRIL 10 MG: 10 TABLET ORAL at 10:12

## 2020-12-19 RX ADMIN — APIXABAN 5 MG: 5 TABLET, FILM COATED ORAL at 10:13

## 2020-12-19 RX ADMIN — Medication 10 ML: at 10:13

## 2020-12-19 RX ADMIN — METOPROLOL SUCCINATE 25 MG: 25 TABLET, EXTENDED RELEASE ORAL at 20:24

## 2020-12-19 RX ADMIN — DEXAMETHASONE SODIUM PHOSPHATE 6 MG: 10 INJECTION INTRAMUSCULAR; INTRAVENOUS at 10:12

## 2020-12-19 RX ADMIN — APIXABAN 5 MG: 5 TABLET, FILM COATED ORAL at 20:24

## 2020-12-19 ASSESSMENT — PAIN SCALES - GENERAL
PAINLEVEL_OUTOF10: 0

## 2020-12-19 NOTE — PROGRESS NOTES
Pulmonary [facility]                             Pulmonary Consult/Progress Note :                Reason for Consultation:COVID-19 positive test (U07.1, COVID-19) with Acute Pneumonia      CC : Shortness of breath  HPI:  Doing Much   SOB has improved  She is down to 10  Less fatigue and more energy  Less swelling legs      PHYSICAL EXAMINATION:     VITAL SIGNS:  BP (!) 144/83   Pulse 62   Temp 97 °F (36.1 °C) (Temporal)   Resp 20   Ht 5' 6\" (1.676 m)   Wt 223 lb 8 oz (101.4 kg)   SpO2 94%   BMI 36.07 kg/m²   Wt Readings from Last 3 Encounters:   12/18/20 223 lb 8 oz (101.4 kg)   10/01/19 206 lb 12.8 oz (93.8 kg)   09/29/17 206 lb (93.4 kg)     Temp Readings from Last 3 Encounters:   12/18/20 97 °F (36.1 °C) (Temporal)     TMAX:  BP Readings from Last 3 Encounters:   12/18/20 (!) 144/83   10/01/19 122/73   09/29/17 136/79     Pulse Readings from Last 3 Encounters:   12/18/20 62   10/01/19 73   09/29/17 64           INTAKE/OUTPUTS:  I/O last 3 completed shifts:   In: 36 [P.O.:820]  Out: 1075 [Urine:1075]    Intake/Output Summary (Last 24 hours) at 12/18/2020 2346  Last data filed at 12/18/2020 1439  Gross per 24 hour   Intake 820 ml   Output 1075 ml   Net -255 ml       General Appearance: alert and oriented to person, place and time, well-developed and   well-nourished, in no acute distress   Eyes: pupils equal, round, and reactive to light, extraocular eye movements intact, conjunctivae normal and sclera anicteric   Neck: neck supple and non tender without mass, no thyromegaly, no thyroid nodules and no cervical adenopathy   Pulmonary/Chest:*Rhonchi bilaterally  Cardiovascular: normal rate, regular rhythm, normal S1 and S2, no murmurs, rubs, clicks or gallops, distal pulses intact, no carotid bruits, no murmurs, no gallops, no carotid bruits and no JVD   Abdomen: obese, soft, non-tender, non-distended, normal bowel sounds, no masses or organomegaly   Extremities: Has +1 edema  Musculoskeletal: normal range of motion, no joint swelling, deformity or tenderness   Neurologic: reflexes normal and symmetric, no cranial nerve deficit noted    LABS/IMAGING:    CBC:  Lab Results   Component Value Date    WBC 12.6 (H) 12/17/2020    HGB 12.3 12/17/2020    HCT 37.0 12/17/2020    MCV 90.7 12/17/2020     12/17/2020    LYMPHOPCT 5.7 (L) 12/17/2020    RBC 4.08 12/17/2020    MCH 30.1 12/17/2020    MCHC 33.2 12/17/2020    RDW 13.3 12/17/2020    NEUTOPHILPCT 86.4 (H) 12/17/2020    MONOPCT 4.9 12/17/2020    BASOPCT 0.2 12/17/2020    NEUTROABS 10.86 (H) 12/17/2020    LYMPHSABS 0.71 (L) 12/17/2020    MONOSABS 0.61 12/17/2020    EOSABS 0.07 12/17/2020    BASOSABS 0.03 12/17/2020       Recent Labs     12/17/20  0500 12/14/20  1245 12/12/20  0615   WBC 12.6* 19.4* 8.2   HGB 12.3 11.7 11.6   HCT 37.0 35.7 35.6   MCV 90.7 90.8 90.4    392 304       BMP:   Recent Labs     12/17/20  0500      K 4.7      CO2 25   BUN 26*   CREATININE 1.0       MG: No results found for: MG  Ca/Phos:   Lab Results   Component Value Date    CALCIUM 8.4 (L) 12/17/2020     Amylase: No results found for: AMYLASE  Lipase: No results found for: LIPASE  LIVER PROFILE: No results for input(s): AST, ALT, LIPASE, BILIDIR, BILITOT, ALKPHOS in the last 72 hours. Invalid input(s): AMYLASE,  ALB    PT/INR: No results for input(s): PROTIME, INR in the last 72 hours. APTT: No results for input(s): APTT in the last 72 hours.     Cardiac Enzymes:  Lab Results   Component Value Date    TROPONINI <0.01 12/14/2020               Chest x-ray shows worsening bilateral pneumonia    PROBLEM LIST:  Patient Active Problem List   Diagnosis    Acute respiratory failure due to COVID-19 ([address]    [facility]    Pneumonia due to COVID-19 virus               ASSESSMENT:  1.)COVID-19 positive test (U07.1, COVID-19) with Acute Pneumonia     2.)  COPD exacerbation secondary to pneumonia  3.)  History of smoking  4.)  Elevated D-dimer  5.)  Smoking      PLAN:  *-continue Decadron and we will start remdesivir  *- Received Tocilizumab 400  *-We will monitor closely  *-Already on Eliquis ,no need for CTA of the chest for now  *_We will try to wean oxygen and monitor closely if get worse then she will need BiPAP and possible transfer to ICU        Jeronimo Le MD,[facility]  Pulmonary&Critical Care Medicine   Director of [address] [place] [address] Director of [address] [place] [address]    Kassy Gibbs    NOTE: This report was transcribed using voice recognition software. Every effort was made to ensure accuracy; however, inadvertent computerized transcription errors may be present.

## 2020-12-19 NOTE — PROGRESS NOTES
Patient's oxygen weaned to 8L NC with SPO2 at 94%, encouraged patient to lay prone when in bed and use of incentive spirometer.      Alhaji Collins RN, BSN

## 2020-12-19 NOTE — PROGRESS NOTES
Subjective:    Feels great no acute complaints  Denies any shortness of breath at rest      Objective:    /66   Pulse 72   Temp 96.8 °F (36 °C) (Temporal)   Resp 18   Ht 5' 6\" (1.676 m)   Wt 232 lb 14.4 oz (105.6 kg)   SpO2 93%   BMI 37.59 kg/m²     In: 480 [P.O.:480]  Out: -     HEENT: NCAT,  Pupils equal.  Heart:  RRR, no murmurs, gallops, or rubs. Lungs:  CTA diminished bilaterally with wheezing.    Abd: bowel sounds present, nontender   Extrem:  No clubbing, cyanosis, or edema     Recent Labs     12/17/20  0500   WBC 12.6*   HGB 12.3   HCT 37.0          Recent Labs     12/17/20  0500      K 4.7      CO2 25   BUN 26*   CREATININE 1.0   CALCIUM 8.4*       Assessment:    Patient Active Problem List   Diagnosis    [facility]    SUKH [facility]acute [facility]) ([facility]    Pneumonia due to COVID-19 virus       Plan:    Admit to tele for covid 19 pna w hypoxemia  Rem, decadron    Toci X1 12/17   93% on 8 l-continue to wean down  Still hypoxemic on ambulation    Supportive care with mdi , vitamins  Pulmonology input appreciated   [place] Hind into atrial fibrillation with RVR on 12/14/2020  Eliquis per cardiology  Toprol-XL for rate control     DVT and GERD prophylaxis  Discharge plan tomorrow if oxygen requirement down to 6 L or less  All consultants notes reviewed    Joselito Donahue MD  11:59 AM  12/19/2020

## 2020-12-20 VITALS
HEIGHT: 66 IN | SYSTOLIC BLOOD PRESSURE: 117 MMHG | OXYGEN SATURATION: 94 % | RESPIRATION RATE: 18 BRPM | TEMPERATURE: 97.5 F | DIASTOLIC BLOOD PRESSURE: 58 MMHG | WEIGHT: 224.6 LBS | BODY MASS INDEX: 36.1 KG/M2 | HEART RATE: 62 BPM

## 2020-12-20 PROCEDURE — 2580000003 HC RX 258: Performed by: NURSE PRACTITIONER

## 2020-12-20 PROCEDURE — 93005 ELECTROCARDIOGRAM TRACING: CPT | Performed by: NURSE PRACTITIONER

## 2020-12-20 PROCEDURE — 6370000000 HC RX 637 (ALT 250 FOR IP): Performed by: INTERNAL MEDICINE

## 2020-12-20 PROCEDURE — 6360000002 HC RX W HCPCS: Performed by: NURSE PRACTITIONER

## 2020-12-20 PROCEDURE — 2700000000 HC OXYGEN THERAPY PER DAY

## 2020-12-20 RX ORDER — DEXAMETHASONE 6 MG/1
6 TABLET ORAL 2 TIMES DAILY WITH MEALS
Qty: 10 TABLET | Refills: 0 | Status: SHIPPED | OUTPATIENT
Start: 2020-12-20 | End: 2020-12-25

## 2020-12-20 RX ORDER — METOPROLOL SUCCINATE 25 MG/1
25 TABLET, EXTENDED RELEASE ORAL 2 TIMES DAILY
Qty: 30 TABLET | Refills: 3 | Status: SHIPPED | OUTPATIENT
Start: 2020-12-20

## 2020-12-20 RX ADMIN — METOPROLOL SUCCINATE 25 MG: 25 TABLET, EXTENDED RELEASE ORAL at 09:20

## 2020-12-20 RX ADMIN — APIXABAN 5 MG: 5 TABLET, FILM COATED ORAL at 09:21

## 2020-12-20 RX ADMIN — DEXAMETHASONE SODIUM PHOSPHATE 6 MG: 10 INJECTION INTRAMUSCULAR; INTRAVENOUS at 09:21

## 2020-12-20 RX ADMIN — Medication 10 ML: at 09:21

## 2020-12-20 RX ADMIN — LISINOPRIL 10 MG: 10 TABLET ORAL at 09:20

## 2020-12-20 ASSESSMENT — PAIN SCALES - GENERAL: PAINLEVEL_OUTOF10: 0

## 2020-12-20 NOTE — PROGRESS NOTES
Pulmonary [facility]                             Pulmonary Consult/Progress Note :                Reason for Consultation:COVID-19 positive test (U07.1, COVID-19) with Acute Pneumonia      CC : Shortness of breath  HPI:  Doing Much better and less SOB   She is down to 6  Less fatigue and more energy  Less swelling legs   Feel great      PHYSICAL EXAMINATION:     VITAL SIGNS:  BP (!) 144/79   Pulse 97   Temp 97.7 °F (36.5 °C) (Temporal)   Resp 18   Ht 5' 6\" (1.676 m)   Wt 232 lb 14.4 oz (105.6 kg)   SpO2 94%   BMI 37.59 kg/m²   Wt Readings from Last 3 Encounters:   12/19/20 232 lb 14.4 oz (105.6 kg)   10/01/19 206 lb 12.8 oz (93.8 kg)   09/29/17 206 lb (93.4 kg)     Temp Readings from Last 3 Encounters:   12/19/20 97.7 °F (36.5 °C) (Temporal)     TMAX:  BP Readings from Last 3 Encounters:   12/19/20 (!) 144/79   10/01/19 122/73   09/29/17 136/79     Pulse Readings from Last 3 Encounters:   12/19/20 97   10/01/19 73   09/29/17 64           INTAKE/OUTPUTS:  I/O last 3 completed shifts:   In: 840 [P.O.:840]  Out: 1550 [Urine:1550]    Intake/Output Summary (Last 24 hours) at 12/19/2020 2332  Last data filed at 12/19/2020 2120  Gross per 24 hour   Intake 840 ml   Output 1550 ml   Net -710 ml       General Appearance: alert and oriented to person, place and time, well-developed and   well-nourished, in no acute distress   Eyes: pupils equal, round, and reactive to light, extraocular eye movements intact, conjunctivae normal and sclera anicteric   Neck: neck supple and non tender without mass, no thyromegaly, no thyroid nodules and no cervical adenopathy   Pulmonary/Chest:*Rhonchi bilaterally  Cardiovascular: normal rate, regular rhythm, normal S1 and S2, no murmurs, rubs, clicks or gallops, distal pulses intact, no carotid bruits, no murmurs, no gallops, no carotid bruits and no JVD   Abdomen: obese, soft, non-tender, non-distended, normal bowel sounds, no masses or organomegaly   Extremities: Has +1 edema  Musculoskeletal: normal range of motion, no joint swelling, deformity or tenderness   Neurologic: reflexes normal and symmetric, no cranial nerve deficit noted    LABS/IMAGING:    CBC:  Lab Results   Component Value Date    WBC 12.6 (H) 12/17/2020    HGB 12.3 12/17/2020    HCT 37.0 12/17/2020    MCV 90.7 12/17/2020     12/17/2020    LYMPHOPCT 5.7 (L) 12/17/2020    RBC 4.08 12/17/2020    MCH 30.1 12/17/2020    MCHC 33.2 12/17/2020    RDW 13.3 12/17/2020    NEUTOPHILPCT 86.4 (H) 12/17/2020    MONOPCT 4.9 12/17/2020    BASOPCT 0.2 12/17/2020    NEUTROABS 10.86 (H) 12/17/2020    LYMPHSABS 0.71 (L) 12/17/2020    MONOSABS 0.61 12/17/2020    EOSABS 0.07 12/17/2020    BASOSABS 0.03 12/17/2020       Recent Labs     12/17/20  0500 12/14/20  1245 12/12/20  0615   WBC 12.6* 19.4* 8.2   HGB 12.3 11.7 11.6   HCT 37.0 35.7 35.6   MCV 90.7 90.8 90.4    392 304       BMP:   Recent Labs     12/17/20  0500      K 4.7      CO2 25   BUN 26*   CREATININE 1.0       MG: No results found for: MG  Ca/Phos:   Lab Results   Component Value Date    CALCIUM 8.4 (L) 12/17/2020     Amylase: No results found for: AMYLASE  Lipase: No results found for: LIPASE  LIVER PROFILE: No results for input(s): AST, ALT, LIPASE, BILIDIR, BILITOT, ALKPHOS in the last 72 hours. Invalid input(s): AMYLASE,  ALB    PT/INR: No results for input(s): PROTIME, INR in the last 72 hours. APTT: No results for input(s): APTT in the last 72 hours.     Cardiac Enzymes:  Lab Results   Component Value Date    TROPONINI <0.01 12/14/2020               Chest x-ray shows worsening bilateral pneumonia    PROBLEM LIST:  Patient Active Problem List   Diagnosis    [facility]    SUKH [facility]acute kidney injury) [facility]    Pneumonia due to COVID-19 virus               ASSESSMENT:  1.)COVID-19 positive test (U07.1, COVID-19) with Acute Pneumonia     2.)  COPD exacerbation secondary to pneumonia  3.)  History of smoking  4.)  Elevated D-dimer  5.)  Smoking      PLAN:  *-continue Decadron and we will start remdesivir  *- Received Tocilizumab 400  *-We will monitor closely  *-Already on Eliquis ,no need for CTA of the chest for now    Once below 4 L and ambulate well ,she may be discharged from Pulmonary stand point once Vika Childp with dr Jah Poole  Pulmonary&Critical Care Medicine   Director of [address] [place] [address] Director of [address] [place] [address]    Anali Cavanaugh    NOTE: This report was transcribed using voice recognition software. Every effort was made to ensure accuracy; however, inadvertent computerized transcription errors may be present.

## 2020-12-20 NOTE — PLAN OF CARE
Problem: Airway Clearance - Ineffective  Goal: Achieve or maintain patent airway  12/20/2020 1600 by Isra May RN  Outcome: Completed  12/20/2020 0544 by Debora Oliveira RN  Outcome: Met This Shift     Problem: Gas Exchange - Impaired  Goal: Absence of hypoxia  12/20/2020 1600 by Isra May RN  Outcome: Completed  12/20/2020 0544 by Debora Oliveira RN  Outcome: Met This Shift  Goal: Promote optimal lung function  12/20/2020 1600 by Isra May RN  Outcome: Completed  12/20/2020 0544 by Debora Oliveira RN  Outcome: Met This Shift     Problem: Breathing Pattern - Ineffective  Goal: Ability to achieve and maintain a regular respiratory rate  12/20/2020 1600 by Isra May RN  Outcome: Completed  12/20/2020 0544 by Debora Oliveira RN  Outcome: Met This Shift     Problem:  Body Temperature -  Risk of, Imbalanced  Goal: Ability to maintain a body temperature within defined limits  12/20/2020 1600 by Isra May RN  Outcome: Completed  12/20/2020 0544 by Debora Oliveira RN  Outcome: Met This Shift  Goal: Will regain or maintain usual level of consciousness  12/20/2020 1600 by Isra May RN  Outcome: Completed  12/20/2020 0544 by Debora Oliveira RN  Outcome: Met This Shift  Goal: Complications related to the disease process, condition or treatment will be avoided or minimized  Outcome: Completed     Problem: Isolation Precautions - Risk of Spread of Infection  Goal: Prevent transmission of infection  Outcome: Completed     Problem: Nutrition Deficits  Goal: Optimize nutrtional status  Outcome: Completed     Problem: Risk for Fluid Volume Deficit  Goal: Maintain normal heart rhythm  Outcome: Completed  Goal: Maintain absence of muscle cramping  Outcome: Completed  Goal: Maintain normal serum potassium, sodium, calcium, phosphorus, and pH  Outcome: Completed     Problem: Loneliness or Risk for Loneliness  Goal: Demonstrate positive use of time alone when socialization is not possible  Outcome: Completed     Problem: Fatigue  Goal: Verbalize increase energy and improved vitality  Outcome: Completed     Problem: Patient Education: Go to Patient Education Activity  Goal: Patient/Family Education  Outcome: Completed     Problem: Skin Integrity:  Goal: Will show no infection signs and symptoms  12/20/2020 1600 by Diana Quinn RN  Outcome: Completed  12/20/2020 0544 by Melyssa Donovan RN  Outcome: Met This Shift  Goal: Absence of new skin breakdown  12/20/2020 1600 by Diana Quinn RN  Outcome: Completed  12/20/2020 0544 by Melyssa Donovan RN  Outcome: Met This Shift     Problem: Falls - Risk of:  Goal: Will remain free from falls  12/20/2020 1600 by Diana Quinn RN  Outcome: Completed  12/20/2020 0544 by Melyssa Donovan RN  Outcome: Met This Shift  Goal: Absence of physical injury  12/20/2020 1600 by Diana Quinn RN  Outcome: Completed  12/20/2020 0544 by Melyssa Donovan RN  Outcome: Met This Shift

## 2020-12-20 NOTE — PROGRESS NOTES
Pulse ox was 95% on room air at rest.  Ambulated patient on room air. Oxygen saturation was 86% on room air while ambulating. Oxygen applied. Recovery pulse ox was 92% on 4 liters of oxygen while ambulating.

## 2020-12-20 NOTE — DISCHARGE SUMMARY
Instructions:      Medication List      ASK your doctor about these medications    hydroCHLOROthiazide 12.5 MG capsule  Commonly known as: Mercedes Pole  Ask about: Which instructions should I use?     lisinopril 10 MG tablet  Commonly known as: PRINIVIL;ZESTRIL          Activity: activity as tolerated  Diet: regular diet    Pt has been advised to: Follow-up with Bonny Armando DO in 1 week.   Follow-up with consultants as recommended by them    Note that over 30 minutes was spent in preparing discharge papers, discussing discharge with patient, medication review, etc.    Signed:  Eldon Desai MD  12/20/2020  11:28 AM

## 2020-12-20 NOTE — CARE COORDINATION
Pt discharging home, will need home O2. Kory IVY to notify the need, O2 tank to be delivered room. notiifekarley RN on unit, referral complete. Palma TREJO

## 2020-12-21 ENCOUNTER — CARE COORDINATION (OUTPATIENT)
Dept: CASE MANAGEMENT | Age: 70
End: 2020-12-21

## 2020-12-22 ENCOUNTER — CARE COORDINATION (OUTPATIENT)
Dept: CASE MANAGEMENT | Age: 70
End: 2020-12-22

## 2020-12-22 ENCOUNTER — TELEPHONE (OUTPATIENT)
Dept: PULMONOLOGY | Age: 70
End: 2020-12-22

## 2020-12-22 LAB
EKG ATRIAL RATE: 51 BPM
EKG P AXIS: 6 DEGREES
EKG P-R INTERVAL: 144 MS
EKG Q-T INTERVAL: 426 MS
EKG QRS DURATION: 66 MS
EKG R AXIS: -10 DEGREES
EKG T AXIS: 27 DEGREES

## 2020-12-22 NOTE — TELEPHONE ENCOUNTER
Call returned to pt after VM left for office. Follow up from hospital. Advised pt to reach out to office re: scheduling a follow up appt.

## 2020-12-22 NOTE — CARE COORDINATION
Covid-19 Outreach call, no answer.   Left VM with contact information and request  for return call at [address], [address] [place] [address] Coordination Transition

## 2020-12-23 NOTE — PROGRESS NOTES
Physician Progress Note      Omar Castaneda  CSN #:                  [id number]  :                       1950  ADMIT DATE:       2020 9:27 PM  [address] DATE:        2020 7:07 PM  RESPONDING  PROVIDER #:        ARIELLA Mars MD          QUERY TEXT:    Pt admitted with COVID-19 and noted to have CRP 28.1 and 5.6, documented acute   respiratory failure, with a WBC increase to 19.4 on . If possible,   please document in progress notes and discharge summary if you are evaluating   and/or treating: The medical record reflects the following:  Risk Factors: COVID-19  Clinical Indicators: Per notes, patient admitted for covid 19 pna. Also noted   to have acute respiratory failure on up to 15L highflow oxygen. Labs: WBC 8.9,   8.2, 19.4, 12.6. CRP 28.1, 5.6. Lactic Acid 1.2, 1.1. Temperature ranging 96   - 98.9 (temporal). Treatment: 15L highflow oxygen, Remdisivir, Decadron, labs, vitals    Thank you,  Alayna Renee, RN, BSN, Clinical Documentation Improvement  Options provided:  -- Sepsis due to COVID-19, POA  -- Sepsis due to COVID-19, not POA  -- COVID-19 without sepsis  -- Other - I will add my own diagnosis  -- Disagree - Not applicable / Not valid  -- Disagree - Clinically unable to determine / Unknown  -- Refer to Clinical Documentation Reviewer    PROVIDER RESPONSE TEXT:    This patient has sepsis due to COVID-19, POA.     Query created by: William Pedro on 2020 10:32 AM      Electronically signed by:  ARIELLA Mars MD 2020 10:06 AM